# Patient Record
Sex: MALE | Race: WHITE | NOT HISPANIC OR LATINO | Employment: OTHER | URBAN - METROPOLITAN AREA
[De-identification: names, ages, dates, MRNs, and addresses within clinical notes are randomized per-mention and may not be internally consistent; named-entity substitution may affect disease eponyms.]

---

## 2019-03-27 ENCOUNTER — TELEPHONE (OUTPATIENT)
Dept: OTHER | Facility: CLINIC | Age: 49
End: 2019-03-27

## 2019-03-27 NOTE — TELEPHONE ENCOUNTER
3/27/2019    Call Regarding Onboarding: [employer] STRIVE PLAN    Attempt 1    Message left with patient     Comments: PT ON BOARDED      Outreach   MG

## 2019-10-15 ENCOUNTER — OFFICE VISIT (OUTPATIENT)
Dept: FAMILY MEDICINE | Facility: CLINIC | Age: 49
End: 2019-10-15
Payer: COMMERCIAL

## 2019-10-15 VITALS
HEART RATE: 95 BPM | TEMPERATURE: 98.7 F | OXYGEN SATURATION: 98 % | DIASTOLIC BLOOD PRESSURE: 83 MMHG | HEIGHT: 65 IN | BODY MASS INDEX: 30.72 KG/M2 | SYSTOLIC BLOOD PRESSURE: 134 MMHG | WEIGHT: 184.4 LBS

## 2019-10-15 DIAGNOSIS — I10 ESSENTIAL HYPERTENSION: ICD-10-CM

## 2019-10-15 DIAGNOSIS — G89.29 CHRONIC INTRACTABLE HEADACHE, UNSPECIFIED HEADACHE TYPE: Primary | ICD-10-CM

## 2019-10-15 DIAGNOSIS — G80.1 SPASTIC DIPLEGIC CEREBRAL PALSY (H): ICD-10-CM

## 2019-10-15 DIAGNOSIS — R51.9 CHRONIC INTRACTABLE HEADACHE, UNSPECIFIED HEADACHE TYPE: Primary | ICD-10-CM

## 2019-10-15 PROCEDURE — 99203 OFFICE O/P NEW LOW 30 MIN: CPT | Performed by: PREVENTIVE MEDICINE

## 2019-10-15 RX ORDER — METOPROLOL SUCCINATE 25 MG/1
25 TABLET, EXTENDED RELEASE ORAL DAILY
Refills: 2 | COMMUNITY
Start: 2019-10-08 | End: 2020-10-30

## 2019-10-15 RX ORDER — AMLODIPINE BESYLATE 5 MG/1
5 TABLET ORAL DAILY
Refills: 2 | COMMUNITY
Start: 2019-04-18 | End: 2020-10-30

## 2019-10-15 RX ORDER — PSEUDOEPHED/ACETAMINOPH/DIPHEN 30MG-500MG
TABLET ORAL
Refills: 10 | COMMUNITY
Start: 2019-03-06 | End: 2020-10-30

## 2019-10-15 RX ORDER — BACLOFEN 10 MG/1
10 TABLET ORAL 2 TIMES DAILY
Refills: 5 | COMMUNITY
Start: 2019-06-23 | End: 2020-10-30

## 2019-10-15 RX ORDER — METOPROLOL SUCCINATE 50 MG/1
TABLET, EXTENDED RELEASE ORAL
Refills: 0 | COMMUNITY
Start: 2019-04-30 | End: 2020-10-30

## 2019-10-15 RX ORDER — SULFAMETHOXAZOLE/TRIMETHOPRIM 800-160 MG
TABLET ORAL
Refills: 0 | COMMUNITY
Start: 2019-04-25 | End: 2019-10-15

## 2019-10-15 RX ORDER — GLYCOPYRROLATE 1 MG/1
1 TABLET ORAL 3 TIMES DAILY
Refills: 0 | COMMUNITY
Start: 2019-02-28 | End: 2022-01-27

## 2019-10-15 RX ORDER — CLINDAMYCIN HCL 300 MG
CAPSULE ORAL
Refills: 0 | COMMUNITY
Start: 2019-09-09 | End: 2019-10-15

## 2019-10-15 ASSESSMENT — MIFFLIN-ST. JEOR: SCORE: 1633.31

## 2019-10-15 ASSESSMENT — PAIN SCALES - GENERAL: PAINLEVEL: NO PAIN (0)

## 2019-10-15 NOTE — PROGRESS NOTES
Subjective     Dakota Wilkinson is a 49 year old male who presents to clinic today for the following health issues:    HPI   Referral to see a neurologist  History of chronic intractable headache, PCP is outside of [place], here for referral to Neurology, Primary at [facility]   Seen by PCP on 10/8/19  Headaches since age 21 years  No past Neurology   Almost every night  Excedrin for migraine every night  Lots of light sensitivity  Frontal  Piercing+  No emesis  No other medication besides Excedrin  Last primary is out of network hence now being seen at [place]         Headaches      Duration: since age 21 years     Description  Location: bilateral in the frontal area   Character: piercing pain   Frequency:  daily  Duration:  Few hours     Intensity:  moderate    Accompanying signs and symptoms:    Precipitating or Alleviating factors:  Nausea/vomiting: no  Dizziness: no  Weakness or numbness: no  Visual changes: none  Fever: no   Sinus or URI symptoms no     History  Head trauma: no  Family history of migraines: no   Previous tests for headaches: no   Neurologist evaluations: no   Able to do daily activities when headache present: YES  Wake with headaches: no  Daily pain medication use: YES- Excedrin   Any changes in: no changes     Precipitating or Alleviating factors (light/sound/sleep/caffeine): Light sensitivity     Therapies tried and outcome: Excedrin    Outcome - usually effective  Frequent/daily pain medication use: YES      Unable to access Care Everywhere due to IT issues at this time     Hypertension Follow-up      Do you check your blood pressure regularly outside of the clinic? No     Are you following a low salt diet? Yes    Are your blood pressures ever more than 140 on the top number (systolic) OR more   than 90 on the bottom number (diastolic), for example 140/90? No    Patient Active Problem List   Diagnosis     Chronic intractable headache, unspecified headache type     Spastic diplegic  "cerebral palsy (H)     Essential hypertension     History reviewed. No pertinent surgical history.    Social History     Tobacco Use     Smoking status: Never Smoker     Smokeless tobacco: Never Used   Substance Use Topics     Alcohol use: Yes     Comment: socially     Family History   Problem Relation Age of Onset     Diabetes Maternal Grandmother      Cerebrovascular Disease Maternal Grandmother      Dementia Maternal Grandmother      Cancer Maternal Grandfather          Current Outpatient Medications   Medication Sig Dispense Refill     ACETAMINOPHEN EXTRA STRENGTH 500 MG tablet TK 2 T TID PRN  10     amLODIPine (NORVASC) 5 MG tablet   2     baclofen (LIORESAL) 10 MG tablet TK SS TO ONE T  PO BID PRF SPASM/PAIN GEF LIORESAL. NEEDS APPOINTMENT FOR  FURTHER REFILLS  5     glycopyrrolate (ROBINUL) 1 MG tablet TK 1 T PO TID  0     metoprolol succinate ER (TOPROL-XL) 25 MG 24 hr tablet   2     metoprolol succinate ER (TOPROL-XL) 50 MG 24 hr tablet TK 1 T PO D  0     Allergies   Allergen Reactions     Amoxicillin      Codeine      Penicillins      Percocet [Oxycodone-Acetaminophen]      BP Readings from Last 3 Encounters:   10/15/19 134/83    Wt Readings from Last 3 Encounters:   10/15/19 83.6 kg (184 lb 6.4 oz)           Reviewed and updated as needed this visit by Provider  Tobacco  Allergies  Meds  Problems  Med Hx  Surg Hx  Fam Hx         Review of Systems   ROS COMP: Constitutional, HEENT, cardiovascular, pulmonary, gi and gu systems are negative, except as otherwise noted.      Objective    /83 (BP Location: Left arm, Patient Position: Chair, Cuff Size: Adult Large)   Pulse 95   Temp 98.7  F (37.1  C) (Oral)   Ht 1.651 m (5' 5\")   Wt 83.6 kg (184 lb 6.4 oz)   SpO2 98%   BMI 30.69 kg/m    Body mass index is 30.69 kg/m .  Physical Exam   GENERAL APPEARANCE: healthy, alert and no distress, ambulates with a cane   EYES: Eyes grossly normal to inspection and conjunctivae and sclerae normal  RESP: " "lungs clear to auscultation - no rales, rhonchi or wheezes  CV: regular rates and rhythm, normal S1 S2, no S3 or S4 and no murmur, click or rub  ABDOMEN: soft, non-tender and no rebound or guarding   MS: extremities normal- no gross deformities noted and peripheral pulses normal  SKIN: no suspicious lesions or rashes  PSYCH: mentation appears normal      Diagnostic Test Results:  Labs reviewed in Epic  No results found for this or any previous visit (from the past 24 hour(s)).        Assessment & Plan     Dakota was seen today for referral.    Diagnoses and all orders for this visit:    Chronic intractable headache, unspecified headache type  -     NEUROLOGY ADULT REFERRAL  -patient requested a referral  -Advised against daily use of Excedrin to reduce risk of rebound headaches     Spastic diplegic cerebral palsy (H)  -stable    Essential hypertension  -at goal on current medication per PCP        BMI:   Estimated body mass index is 30.69 kg/m  as calculated from the following:    Height as of this encounter: 1.651 m (5' 5\").    Weight as of this encounter: 83.6 kg (184 lb 6.4 oz).       Return in about 6 months (around 4/15/2020) for Routine Visit.    Sunita Dumont MD MPH    [facility]        "

## 2019-10-15 NOTE — PATIENT INSTRUCTIONS
At [facility], we strive to deliver an exceptional experience to you, every time we see you.  If you receive a survey in the mail, please send us back your thoughts. We really do value your feedback.    Based on your medical history, these are the current health maintenance/preventive care services that you are due for (some may have been done at this visit.)  Health Maintenance Due   Topic Date Due     PREVENTIVE CARE VISIT  1970     HIV SCREENING  06/13/1986     DTAP/TDAP/TD IMMUNIZATION (1 - Tdap) 06/13/1996     LIPID  06/13/2006     PHQ-2  01/01/2019         Suggested websites for health information:  Www.[facility].org : Up to date and easily searchable information on multiple topics.  Www.medlineplus.gov : medication info, interactive tutorials, watch real surgeries online  Www.familydoctor.org : good info from the Academy of Family Physicians  Www.cdc.gov : public health info, travel advisories, epidemics (H1N1)  Www.aap.org : children's health info, normal development, vaccinations  Www.health.[facility].[place].us : [place] dept of health, public health issues in [place], N1N1    Your care team:                            Family Medicine Internal Medicine   MD Tanner Santana MD Shantel Branch-Fleming, MD Katya Georgiev PA-C Nam Ho, MD Pediatrics   JOSE A Arthur, MD Megha Snider CNP, MD Deborah Mielke, MD Kim Thein, APRN [facility]      Clinic hours: Monday - Thursday 7 am-7 pm; Fridays 7 am-5 pm.   Urgent care: Monday - Friday 11 am-9 pm; Saturday and Sunday 9 am-5 pm.  Pharmacy : Monday -Thursday 8 am-8 pm; Friday 8 am-6 pm; Saturday and Sunday 9 am-5 pm.     Clinic: [phone]   Pharmacy: [phone]

## 2019-10-29 ENCOUNTER — HEALTH MAINTENANCE LETTER (OUTPATIENT)
Age: 49
End: 2019-10-29

## 2019-11-12 ENCOUNTER — PRE VISIT (OUTPATIENT)
Dept: NEUROLOGY | Facility: CLINIC | Age: 49
End: 2019-11-12

## 2019-11-12 NOTE — TELEPHONE ENCOUNTER
November 12, 2019              Stacy Young CMA     Chief Complaint   Patient presents with     Previsit     Complete       Pre-Visit Documentation: Dakota Wilkinson is a 49 year old male  Patient stated he has never seen a neurologist before and has not had any imaging done of his head in 20+ years.    Current Outpatient Medications   Medication Sig Dispense Refill     ACETAMINOPHEN EXTRA STRENGTH 500 MG tablet TK 2 T TID PRN  10     amLODIPine (NORVASC) 5 MG tablet   2     baclofen (LIORESAL) 10 MG tablet TK SS TO ONE T  PO BID PRF SPASM/PAIN GEF LIORESAL. NEEDS APPOINTMENT FOR  FURTHER REFILLS  5     glycopyrrolate (ROBINUL) 1 MG tablet TK 1 T PO TID  0     metoprolol succinate ER (TOPROL-XL) 25 MG 24 hr tablet   2     metoprolol succinate ER (TOPROL-XL) 50 MG 24 hr tablet TK 1 T PO D  0       ASSESSMENT / PLAN:  No diagnosis found.

## 2019-11-13 ENCOUNTER — OFFICE VISIT (OUTPATIENT)
Dept: NEUROLOGY | Facility: CLINIC | Age: 49
End: 2019-11-13
Attending: PREVENTIVE MEDICINE
Payer: COMMERCIAL

## 2019-11-13 VITALS
WEIGHT: 180 LBS | HEIGHT: 65 IN | BODY MASS INDEX: 29.99 KG/M2 | DIASTOLIC BLOOD PRESSURE: 89 MMHG | HEART RATE: 69 BPM | SYSTOLIC BLOOD PRESSURE: 132 MMHG | OXYGEN SATURATION: 97 %

## 2019-11-13 DIAGNOSIS — G43.019 INTRACTABLE MIGRAINE WITHOUT AURA AND WITHOUT STATUS MIGRAINOSUS: Primary | ICD-10-CM

## 2019-11-13 DIAGNOSIS — G47.00 INSOMNIA, UNSPECIFIED TYPE: ICD-10-CM

## 2019-11-13 PROCEDURE — 99204 OFFICE O/P NEW MOD 45 MIN: CPT | Performed by: PSYCHIATRY & NEUROLOGY

## 2019-11-13 RX ORDER — GABAPENTIN 300 MG/1
300 CAPSULE ORAL SEE ADMIN INSTRUCTIONS
Qty: 90 CAPSULE | Refills: 3 | Status: SHIPPED | OUTPATIENT
Start: 2019-11-13 | End: 2020-10-30

## 2019-11-13 ASSESSMENT — MIFFLIN-ST. JEOR: SCORE: 1613.35

## 2019-11-13 NOTE — PROGRESS NOTES
Visit Date:   11/13/2019      This is a patient of Dr. Sunita Dumont.      INTERVAL HISTORY:  This patient is a 49-year-old ambidextrous man seen for evaluation of light sensitivity and headache.  He reports he has had the problem for 20 years.  He has migraine-type headaches.  The headaches are mainly in his forehead and in his face.  They are especially noted in the bitemporal region.  He is extremely light sensitive.  Incandescent lights are bad. LED lights are okay.  Sunlight is okay.  The headaches are worse at night when there is artificial light in the house.  He has LED lights in his bedroom, but the rest of the house has incandescent lights or other old kind of lights.  Fluorescent lights also bother him.  He will have the extreme light sensitivity, then he will start getting the headache, then he will get nausea and photophobia.  He will also get neck pain.  He will have to go into a dark, quiet room for about 30 minutes.  He sometimes will wear sunglasses and that will help.  He lives with his mother and stepfather.  He cannot sleep with the headache.  His sleep as a rule is poor.  He only gets up to 3 hours at night.  He may take a nap during the day.  He never feels rested and that has been the case for 20 years.  He does take Excedrin 2 tablets at night and that will help after 15 minutes.  He has never been on gabapentin or nortriptyline.  He gets an occasional sparkling in his vision, but otherwise, no visual symptoms.  He has no problem with hearing, speech or swallowing.  He does not have focal symptoms in his arms, such as numbness or tingling.  He does have spastic diplegia since birth.  He has no problem with bowel or bladder control.  His diet is good.  He does exercise at the gym.  He focuses on upper body.  He does go on the treadmill, but has to go at a slow pace.      PAST MEDICAL HISTORY:  Significant for cerebral palsy with spastic diplegia.  He also has high blood pressure.  He does not  have diabetes, thyroid or asthma.  He did fill out a medical history form, including a 14-point review of systems.  There is nothing to add.  He takes baclofen for the spasticity.  He has not had pertinent surgery or trauma to the head or neck.        HABITS:  He rarely drinks alcohol.  He does not smoke.      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      FAMILY HISTORY:  Noncontributory.  His mother does not have headaches, and he has no siblings.      PHYSICAL EXAMINATION:   GENERAL:  The patient is cooperative and in no distress.   VITAL SIGNS:  His blood pressure is 132/89.   NECK:  There are no carotid bruits.   HEART:  Auscultation of the heart shows S1 and S2.   NEUROLOGIC:  The patient is alert, oriented and lucid.  Cranial nerve testing shows full visual fields to confrontation.  Funduscopic exam shows sharp discs bilaterally.  Visual acuity 20/20 bilaterally.  Eye movements are complete and conjugate without nystagmus.  Pupils react to light.  Facial sensation is normal.  Face moves symmetrically.  Palate elevates in the midline.  Tongue protrudes in the midline.  Motor evaluation shows no pronator drift, normal finger-tapping and finger-nose-finger.  Heel-knee-shin is quite difficult because of his spastic diplegia.  His ankles are inverted bilaterally.  There is marked limitation of movement in the legs.  He has good strength and dexterity in the arms and hands.  Muscle stretch reflexes are trace and symmetric in the arms and normal at the knees and reduced at the ankles.  Toes are mute.  Sensory exam shows some reduction in vibration at the left great toe, but preserved vibration and temperature in the right foot.  Modality is normal in the hands.  He walks with an obvious spastic diplegia.      IMAGING:  He does report that he had a CAT scan of the head done years ago for headache and it was unremarkable.      ASSESSMENT:   1.  Intractable migraine  with intense photophobia.   2.  Spastic diplegia.      DISCUSSION:  This patient is seen for evaluation of headache and intense photophobia.  His exam on this point is unremarkable.  He has had this problem for 20 years.  He also has issues with insomnia.  At this point, I am going to try him on a preventive medication, gabapentin, building up to 300 mg 3 times a day to see if that can keep the headaches from escalating.  If that is ineffective, a trial of nortriptyline could be undertaken.      I am going to refer him for Sleep consultation because of his chronic insomnia.  That could be a contributing factor.      Hopefully, the gabapentin will allow him to use less of the Excedrin Migraine and reduce the risk of medication-overuse headache.  I will see him in followup in a month for reexamination.         OG MORALES MD             D: 2019   T: 2019   MT: MIHAELA      Name:     DENEEN FLORES   MRN:      [id number]        Account:      [id number]   :      1970           Visit Date:   2019      Document: [id number]       cc: Sunita Dumont MD

## 2019-11-13 NOTE — LETTER
11/13/2019         RE: Dakota Wilkinson  [address] [place] [address]        Dear Colleague,    Thank you for referring your patient, Dakota Wilkinson, to the [facility]. Please see a copy of my visit note below.    Visit Date:   11/13/2019      This is a patient of Dr. Sunita Dumont.      INTERVAL HISTORY:  This patient is a 49-year-old ambidextrous man seen for evaluation of light sensitivity and headache.  He reports he has had the problem for 20 years.  He has migraine-type headaches.  The headaches are mainly in his forehead and in his face.  They are especially noted in the bitemporal region.  He is extremely light sensitive.  Incandescent lights are bad. LED lights are okay.  Sunlight is okay.  The headaches are worse at night when there is artificial light in the house.  He has LED lights in his bedroom, but the rest of the house has incandescent lights or other old kind of lights.  Fluorescent lights also bother him.  He will have the extreme light sensitivity, then he will start getting the headache, then he will get nausea and photophobia.  He will also get neck pain.  He will have to go into a dark, quiet room for about 30 minutes.  He sometimes will wear sunglasses and that will help.  He lives with his mother and stepfather.  He cannot sleep with the headache.  His sleep as a rule is poor.  He only gets up to 3 hours at night.  He may take a nap during the day.  He never feels rested and that has been the case for 20 years.  He does take Excedrin 2 tablets at night and that will help after 15 minutes.  He has never been on gabapentin or nortriptyline.  He gets an occasional sparkling in his vision, but otherwise, no visual symptoms.  He has no problem with hearing, speech or swallowing.  He does not have focal symptoms in his arms, such as numbness or tingling.  He does have spastic diplegia since birth.  He has no problem with bowel or bladder control.  His diet  is good.  He does exercise at the gym.  He focuses on upper body.  He does go on the treadmill, but has to go at a slow pace.      PAST MEDICAL HISTORY:  Significant for cerebral palsy with spastic diplegia.  He also has high blood pressure.  He does not have diabetes, thyroid or asthma.  He did fill out a medical history form, including a 14-point review of systems.  There is nothing to add.  He takes baclofen for the spasticity.  He has not had pertinent surgery or trauma to the head or neck.        HABITS:  He rarely drinks alcohol.  He does not smoke.      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      FAMILY HISTORY:  Noncontributory.  His mother does not have headaches, and he has no siblings.      PHYSICAL EXAMINATION:   GENERAL:  The patient is cooperative and in no distress.   VITAL SIGNS:  His blood pressure is 132/89.   NECK:  There are no carotid bruits.   HEART:  Auscultation of the heart shows S1 and S2.   NEUROLOGIC:  The patient is alert, oriented and lucid.  Cranial nerve testing shows full visual fields to confrontation.  Funduscopic exam shows sharp discs bilaterally.  Visual acuity 20/20 bilaterally.  Eye movements are complete and conjugate without nystagmus.  Pupils react to light.  Facial sensation is normal.  Face moves symmetrically.  Palate elevates in the midline.  Tongue protrudes in the midline.  Motor evaluation shows no pronator drift, normal finger-tapping and finger-nose-finger.  Heel-knee-shin is quite difficult because of his spastic diplegia.  His ankles are inverted bilaterally.  There is marked limitation of movement in the legs.  He has good strength and dexterity in the arms and hands.  Muscle stretch reflexes are trace and symmetric in the arms and normal at the knees and reduced at the ankles.  Toes are mute.  Sensory exam shows some reduction in vibration at the left great toe, but preserved vibration and temperature in  the right foot.  Modality is normal in the hands.  He walks with an obvious spastic diplegia.      IMAGING:  He does report that he had a CAT scan of the head done years ago for headache and it was unremarkable.      ASSESSMENT:   1.  Intractable migraine with intense photophobia.   2.  Spastic diplegia.      DISCUSSION:  This patient is seen for evaluation of headache and intense photophobia.  His exam on this point is unremarkable.  He has had this problem for 20 years.  He also has issues with insomnia.  At this point, I am going to try him on a preventive medication, gabapentin, building up to 300 mg 3 times a day to see if that can keep the headaches from escalating.  If that is ineffective, a trial of nortriptyline could be undertaken.      I am going to refer him for Sleep consultation because of his chronic insomnia.  That could be a contributing factor.      Hopefully, the gabapentin will allow him to use less of the Excedrin Migraine and reduce the risk of medication-overuse headache.  I will see him in followup in a month for reexamination.         OG GALLEGO MD             D: 2019   T: 2019   MT: MIHAELA      Name:     DENEEN FLORES   MRN:      [id number]        Account:      [id number]   :      1970           Visit Date:   2019      Document: [id number]       cc: Sunita Dumont MD      Again, thank you for allowing me to participate in the care of your patient.        Sincerely,        Og Gallego MD

## 2019-11-13 NOTE — NURSING NOTE
"Dakota Wilkinson's goals for this visit include: consult  He requests these members of his care team be copied on today's visit information:     PCP: Sunita Dumont    Referring Provider:  Sunita Dumont MD  [address] [place] [address]    /89   Pulse 69   Ht 1.651 m (5' 5\")   Wt 81.6 kg (180 lb)   SpO2 97%   BMI 29.95 kg/m      Do you need any medication refills at today's visit? n  "

## 2020-01-22 PROBLEM — G80.1 SPASTIC DIPLEGIC CEREBRAL PALSY (H): Chronic | Status: ACTIVE | Noted: 2019-10-15

## 2020-01-22 PROBLEM — S82.252A CLOSED DISPLACED COMMINUTED FRACTURE OF SHAFT OF LEFT TIBIA: Status: ACTIVE | Noted: 2017-07-14

## 2020-01-22 PROBLEM — S82.252A CLOSED DISPLACED COMMINUTED FRACTURE OF SHAFT OF LEFT TIBIA: Status: RESOLVED | Noted: 2017-07-14 | Resolved: 2020-01-22

## 2020-01-22 PROBLEM — G80.1 SPASTIC DIPLEGIC CEREBRAL PALSY (H): Status: ACTIVE | Noted: 2019-10-15

## 2020-01-22 PROBLEM — I10 ESSENTIAL HYPERTENSION: Chronic | Status: ACTIVE | Noted: 2019-10-15

## 2020-01-22 PROBLEM — R51.9 CHRONIC INTRACTABLE HEADACHE, UNSPECIFIED HEADACHE TYPE: Chronic | Status: ACTIVE | Noted: 2019-10-15

## 2020-01-22 PROBLEM — R61 EXCESSIVE SWEATING: Status: ACTIVE | Noted: 2020-01-22

## 2020-01-22 PROBLEM — G89.29 CHRONIC INTRACTABLE HEADACHE, UNSPECIFIED HEADACHE TYPE: Chronic | Status: ACTIVE | Noted: 2019-10-15

## 2020-01-23 ENCOUNTER — OFFICE VISIT (OUTPATIENT)
Dept: SLEEP MEDICINE | Facility: CLINIC | Age: 50
End: 2020-01-23
Attending: PSYCHIATRY & NEUROLOGY
Payer: COMMERCIAL

## 2020-01-23 VITALS
BODY MASS INDEX: 23.32 KG/M2 | SYSTOLIC BLOOD PRESSURE: 128 MMHG | WEIGHT: 140 LBS | HEIGHT: 65 IN | DIASTOLIC BLOOD PRESSURE: 86 MMHG | OXYGEN SATURATION: 93 % | HEART RATE: 92 BPM

## 2020-01-23 DIAGNOSIS — G47.00 INSOMNIA, UNSPECIFIED TYPE: ICD-10-CM

## 2020-01-23 DIAGNOSIS — R53.83 MALAISE AND FATIGUE: ICD-10-CM

## 2020-01-23 DIAGNOSIS — R53.81 MALAISE AND FATIGUE: ICD-10-CM

## 2020-01-23 DIAGNOSIS — I10 ESSENTIAL HYPERTENSION: Primary | Chronic | ICD-10-CM

## 2020-01-23 DIAGNOSIS — F51.04 CHRONIC INSOMNIA: ICD-10-CM

## 2020-01-23 DIAGNOSIS — R06.89 DYSPNEA AND RESPIRATORY ABNORMALITY: ICD-10-CM

## 2020-01-23 DIAGNOSIS — G47.9 DISTURBANCE IN SLEEP BEHAVIOR: ICD-10-CM

## 2020-01-23 DIAGNOSIS — R06.00 DYSPNEA AND RESPIRATORY ABNORMALITY: ICD-10-CM

## 2020-01-23 PROCEDURE — 99245 OFF/OP CONSLTJ NEW/EST HI 55: CPT | Performed by: INTERNAL MEDICINE

## 2020-01-23 RX ORDER — ZOLPIDEM TARTRATE 5 MG/1
TABLET ORAL
Qty: 1 TABLET | Refills: 0 | Status: SHIPPED | OUTPATIENT
Start: 2020-01-23 | End: 2020-10-30

## 2020-01-23 SDOH — HEALTH STABILITY: MENTAL HEALTH: HOW OFTEN DO YOU HAVE A DRINK CONTAINING ALCOHOL?: 2-4 TIMES A MONTH

## 2020-01-23 ASSESSMENT — MIFFLIN-ST. JEOR: SCORE: 1431.92

## 2020-01-23 NOTE — PROGRESS NOTES
Sleep Consultation:    Date on this visit: 1/23/2020    Dakota Wilkinson is sent by Wilmar Gallego for a sleep consultation regarding insomnia.    Primary Physician: Sunita Dumont     Chief Complaint   Patient presents with     Sleep Problem     consult- to get help with insomnia      He has sleep onset > maintenance difficulties. This has been a problem for '27  Years'. No trigger recalled.   He has 'stress'. He has trouble turning his brain off.     Dakota goes to bed at 11:00 PM during the week. He gets up at 4-5 AM with an alarm out of habit. He falls asleep in 120 minutes. He wakes up 3 times a night and has trouble falling back to sleep because of an overactive brain. He wakwes up because of Pets.     On weekends, Dakota goes to bed at 12:00 AM.  He wakes up at 7-8 AM without an alarm. Patient gets an average of 3-4 hours of sleep per night.     Patient does use electronics in bed. He listens to music in bed.     Dakota does snore. Patient does not have a regular bed partner. There is not report of gasping, choking and snorting.  He does not have witnessed apneas. Patient sleeps on his side.     He has occasional morning headaches (1/week), denies no restless legs. He gets muscle spasms that interfere with sleep but not as much his overactive brain. They occur about 2 times/week and occur in middle of the night and wake him up. He kicks in his sleep.     Dakota denies any sleep walking, sleep talking, sleep paralysis, cataplexy and hypnogogic/hypnopompic hallucinations.  He has taken his clothes off in bed while sleeping.     Dakota denies difficulty breathing through his nose and reflux at night.      Patient describes themself as neither a morning or night person. Patient's Tamms Sleepiness score 0/24 inconsistent with excessive  daytime sleepiness.  Rescored 2. He has fatigue.     Dakota naps 1 times per week on Mondays for 30-60 minutes. He takes some inadvertant naps while 'resting'.  He does not drive.  He uses infrequent sodas    Allergies:    Allergies   Allergen Reactions     Amoxicillin      Codeine      Morphine Unknown     Penicillins      Percocet [Oxycodone-Acetaminophen]        Medications:    Current Outpatient Medications   Medication Sig Dispense Refill     ACETAMINOPHEN EXTRA STRENGTH 500 MG tablet TK 2 T TID PRN  10     amLODIPine (NORVASC) 5 MG tablet Take 5 mg by mouth daily   2     aspirin-acetaminophen-caffeine (EXCEDRIN MIGRAINE) 250-250-65 MG tablet Take 2 tablets by mouth 2 times daily       baclofen (LIORESAL) 10 MG tablet Take 10 mg by mouth 2 times daily   5     gabapentin (NEURONTIN) 300 MG capsule Take 1 capsule (300 mg) by mouth See Admin Instructions One po q day for 3 days, then one po bid for 3 days, then one po tid. 90 capsule 3     glycopyrrolate (ROBINUL) 1 MG tablet Take 1 mg by mouth 3 times daily   0     metoprolol succinate ER (TOPROL-XL) 25 MG 24 hr tablet Take 25 mg by mouth daily   2     metoprolol succinate ER (TOPROL-XL) 50 MG 24 hr tablet TK 1 T PO D  0       Problem List:  Patient Active Problem List    Diagnosis Date Noted     Spastic diplegic cerebral palsy (H) 10/15/2019     Priority: High     Migraines      Priority: Medium     Essential hypertension 10/15/2019     Priority: Medium     Excessive sweating 01/22/2020     Priority: Low        Past Medical/Surgical History:  Past Medical History:   Diagnosis Date     Closed displaced comminuted fracture of shaft of left tibia 7/14/2017     Past Surgical History:   Procedure Laterality Date     ORTHOPEDIC SURGERY  07/14/2017    IM ZARA TIBIA 7/14/2017 Leg Lower/LeftProcedure: IM ZARA LEFT TIBIA; Laterality: Left; Surgeon: Zaki Bonilla MD; Service: Orthopedics       ORTHOPEDIC SURGERY Bilateral 1993    multiple arthroscopies early 1990s     ORTHOPEDIC SURGERY Bilateral 1993    nerve 'tranposition'? 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[employer]   Social Needs     Financial resource strain: Not on file     Food insecurity:     Worry: Not on file     Inability: Not on file     Transportation needs:     Medical: Not on file     Non-medical: Not on file   Tobacco Use     Smoking status: Former Smoker     Packs/day: 0.00     Smokeless tobacco: Never Used   Substance and Sexual Activity     Alcohol use: Yes     Frequency: 2-4 times a month     Comment: socially     Drug use: Never     Sexual activity: Not Currently   Lifestyle     Physical activity:     Days per week: Not on file     Minutes per session: Not on file     Stress: Not on file   Relationships     Social connections:     Talks on phone: Not on file     Gets together: Not on file     Attends Sabianism service: Not on file     Active member of club or organization: Not on file     Attends meetings of clubs or organizations: Not on file     Relationship status: Not on file     Intimate partner violence:     Fear of current or ex partner: Not on file     Emotionally abused: Not on file     Physically abused: Not on file     Forced sexual activity: Not on file   Other Topics Concern     Not on file   Social History Narrative     Not on file       Family History:  Family History   Problem Relation Age of Onset     Diabetes Maternal Grandmother      Cerebrovascular Disease Maternal Grandmother      Dementia Maternal Grandmother      Cancer Maternal Grandfather        Review of Systems:  A complete review of systems reviewed by me is negative with the exeption of what has been mentioned in the history of present illness.  CONSTITUTIONAL: NEGATIVE for weight gain/loss, fever, chills, sweats or night sweats, drug allergies.  EYES: NEGATIVE for changes in vision, blind spots, double vision.  ENT:  "NEGATIVE for ear pain, sore throat, sinus pain, post-nasal drip, runny nose, bloody nose  CARDIAC: NEGATIVE for fast heartbeats or fluttering in chest, chest pain or pressure, breathlessness when lying flat, swollen legs or swollen feet.  NEUROLOGIC:  POSITIVE for  headaches  DERMATOLOGIC: NEGATIVE for rashes, new moles or change in mole(s)  PULMONARY: NEGATIVE SOB at rest, SOB with activity, dry cough, productive cough, coughing up blood, wheezing or whistling when breathing.    GASTROINTESTINAL: NEGATIVE for nausea or vomitting, loose or watery stools, fat or grease in stools, constipation, abdominal pain, bowel movements black in color or blood noted.  GENITOURINARY: NEGATIVE for pain during urination, blood in urine, urinating more frequently than usual, irregular menstrual periods.  MUSCULOSKELETAL: NEGATIVE for muscle pain, bone or joint pain, swollen joints.  ENDOCRINE: NEGATIVE for increased thirst or urination, diabetes.  LYMPHATIC: NEGATIVE for swollen lymph nodes, lumps or bumps in the breasts or nipple discharge.    Physical Examination:  Vitals: BP (!) 138/92   Pulse 92   Ht 1.651 m (5' 5\")   Wt 63.5 kg (140 lb)   SpO2 93%   BMI 23.30 kg/m    BMI= Body mass index is 23.3 kg/m .    Neck Cir (cm): 40 cm    [place] Total Score 1/23/2020   Total score - [place] 0       HENNA Total Score: 24 (01/23/20 0800)    GENERAL APPEARANCE: alert and no distress  EYES: Eyes grossly normal to inspection and conjunctivae and sclerae normal  HENT: nose and mouth without ulcers or lesions and oropharynx crowded  NECK: no adenopathy, no asymmetry, masses, or scars and thyroid normal to palpation  RESP: lungs clear to auscultation - no rales, rhonchi or wheezes  CV: regular rates and rhythm, normal S1 S2, no S3 or S4 and no murmur, click or rub  ABDOMEN: schaphold  MS: extremities normal- no gross deformities noted  NEURO: mentation intact, speech normal and cranial nerves 2-12 intact  PSYCH: mentation appears normal and " affect normal/bright  Mallampati Class: IV.  Tonsillar Stage: difficult exam due to body habitus and gag.    Impression/Plan:     Sleep onset, maintenance difficulties  Suspect primary problem is Psychophysiologic insomnia   We discussed stimulus control , sleep needs, sleep restriction  - Read the book Say Good Night To Insomnia   - Referral for cognitive behavioral training placed     Mild snoring, fatigue (ESS 2), sleep maintenance difficulties, no current bedpartner, crowded oropharynx. Comorbid hypertension. Polysomnogram (using 4% desaturation/Medicare/2012 AASM 1B scoring rules) for modest probability obstructive sleep apnea.  Ambien if needed. Patient is a poor candidate for Home Sleep Testing due to symptoms of QUINN but low pre-test probability of QUINN  (STOPBANG 3) and chronic severe insomnia (HENNA score 24).      Literature provided regarding sleep apnea and insomnia.      He will follow up with me in approximately two weeks after his sleep study has been competed to review the results and discuss plan of care.         Obstructive sleep apnea reviewed.    Butch Osman MD     CC: Wilmar Gallego

## 2020-01-23 NOTE — PATIENT INSTRUCTIONS
Your BMI is Body mass index is 23.3 kg/m .  Weight management is a personal decision.  If you are interested in exploring weight loss strategies, the following discussion covers the approaches that may be successful. Body mass index (BMI) is one way to tell whether you are at a healthy weight, overweight, or obese. It measures your weight in relation to your height.  A BMI of 18.5 to 24.9 is in the healthy range. A person with a BMI of 25 to 29.9 is considered overweight, and someone with a BMI of 30 or greater is considered obese. More than two-thirds of American adults are considered overweight or obese.  Being overweight or obese increases the risk for further weight gain. Excess weight may lead to heart disease and diabetes.  Creating and following plans for healthy eating and physical activity may help you improve your health.  Weight control is part of healthy lifestyle and includes exercise, emotional health, and healthy eating habits. Careful eating habits lifelong are the mainstay of weight control. Though there are significant health benefits from weight loss, long-term weight loss with diet alone may be very difficult to achieve- studies show long-term success with dietary management in less than 10% of people. Attaining a healthy weight may be especially difficult to achieve in those with severe obesity. In some cases, medications, devices and surgical management might be considered.  What can you do?  If you are overweight or obese and are interested in methods for weight loss, you should discuss this with your provider.     Consider reducing daily calorie intake by 500 calories.     Keep a food journal.     Avoiding skipping meals, consider cutting portions instead.    Diet combined with exercise helps maintain muscle while optimizing fat loss. Strength training is particularly important for building and maintaining muscle mass. Exercise helps reduce stress, increase energy, and improves fitness.  Increasing exercise without diet control, however, may not burn enough calories to loose weight.       Start walking three days a week 10-20 minutes at a time    Work towards walking thirty minutes five days a week     Eventually, increase the speed of your walking for 1-2 minutes at time    In addition, we recommend that you review healthy lifestyles and methods for weight loss available through the National Institutes of Health patient information sites:  http://win.niddk.nih.gov/publications/index.htm    And look into health and wellness programs that may be available through your health insurance provider, employer, local community center, or fatoumata club.    Weight management plan: Patient was referred to their PCP to discuss a diet and exercise plan.    Read the book Say Good Night To Insomnia        Cognitive Behavioral Therapy for Insomnia (CBT-I)    What is CBT-I?    Cognitive Behavioral Therapy for Insomnia, also known as CBT-I, is a highly effective non-drug treatment for insomnia. The American College of Physicians recommends CBT-I as the first treatment for chronic insomnia.  Research has shown CBT-I to be safer and more effective long term than sleeping pills.    What does CBT-I involve?     CBT-I targets behaviors that lead to chronic insomnia:    Habits that weaken the bed as a cue for sleep    Habits that weaken your body's sleep drive and sleep/wake clock     Unhelpful sleep thoughts that increase sleep-related worry and arousal.    The process works like a training program that provides you with the information and coaching needed to implement proven strategies to get a better night's sleep.    The [facility] offers several effective treatment options.  You can do CBT-I from the convenience of your own home through our Online CBT-I and Virtual CBT-I options. Our program also offers in-person CBTI-I at certain primary care clinics, specialty clinics, and   [facility].    How much effort will it take?    To get the full benefit from CBT-I, you will need to put into practice the strategies recommended as part of your personalized program.  You will keep a daily sleep diary throughout treatment to record your sleep patterns and progress.      How long will it take to work?    People often see improvement in their sleep within a few weeks. Research shows if you keep practicing the skills you learn your sleep is likely to continue to improve 6-12 months after treatment.    Are there side effects?    Unlike many prescribed sleeping pills, CBT-I is a safe treatment with few side effects.  During the first few weeks, you may experience an increase in daytime sleepiness.     What about sleep medication?    Some people choose to stop using sleep medication prior to beginning CBT-I.  Others gradually reduce or stop using medication during treatment with the guidance of their prescribing provider. Always talk with you prescribing provider before making any changes to your medication.    How do I get started?    [facility] CBT-I begins with a pre-clinical phone visit with a member of our Sleep Therapy Management team.  During the phone visit, your care coordinator will discuss your treatment options, answer any questions and get you set to start sleep training. You can schedule your insomnia preclinical phone visit by calling the [facility] at [place] ([phone]) or [place] ([phone]).

## 2020-01-23 NOTE — NURSING NOTE
"Chief Complaint   Patient presents with     Sleep Problem     consult- to get help with insomnia       Initial BP (!) 138/92   Pulse 92   Ht 1.651 m (5' 5\")   Wt 63.5 kg (140 lb)   SpO2 93%   BMI 23.30 kg/m   Estimated body mass index is 23.3 kg/m  as calculated from the following:    Height as of this encounter: 1.651 m (5' 5\").    Weight as of this encounter: 63.5 kg (140 lb).    Medication Reconciliation: complete    Neck circumference: 15.5 inches / 39.5 centimeters.      "

## 2020-01-28 ENCOUNTER — DOCUMENTATION ONLY (OUTPATIENT)
Dept: SLEEP MEDICINE | Facility: CLINIC | Age: 50
End: 2020-01-28
Payer: COMMERCIAL

## 2020-01-28 DIAGNOSIS — F51.04 CHRONIC INSOMNIA: ICD-10-CM

## 2020-01-28 NOTE — PROGRESS NOTES
Pt contacted for [facility] Insomnia preclinical visit. Pt states that he struggles with waking up during the night and is unable to go back to sleep.  Overview of CBT-I was given. Pt elected for in-person CBT-I. Sleep diaries and first module explained and emailed to pt.  Pt scheduled appointment for 2/6/20 with Dr. Heredia.

## 2020-02-06 ENCOUNTER — OFFICE VISIT (OUTPATIENT)
Dept: SLEEP MEDICINE | Facility: CLINIC | Age: 50
End: 2020-02-06
Payer: COMMERCIAL

## 2020-02-06 VITALS
HEART RATE: 91 BPM | HEIGHT: 65 IN | WEIGHT: 150 LBS | OXYGEN SATURATION: 94 % | DIASTOLIC BLOOD PRESSURE: 94 MMHG | SYSTOLIC BLOOD PRESSURE: 132 MMHG | BODY MASS INDEX: 24.99 KG/M2

## 2020-02-06 DIAGNOSIS — F51.03 PARADOXICAL INSOMNIA: ICD-10-CM

## 2020-02-06 DIAGNOSIS — F51.04 CHRONIC INSOMNIA: ICD-10-CM

## 2020-02-06 DIAGNOSIS — F51.04 CHRONIC INSOMNIA: Primary | ICD-10-CM

## 2020-02-06 PROCEDURE — 90791 PSYCH DIAGNOSTIC EVALUATION: CPT | Performed by: PSYCHOLOGIST

## 2020-02-06 ASSESSMENT — PATIENT HEALTH QUESTIONNAIRE - PHQ9
SUM OF ALL RESPONSES TO PHQ QUESTIONS 1-9: 6
5. POOR APPETITE OR OVEREATING: NOT AT ALL

## 2020-02-06 ASSESSMENT — ANXIETY QUESTIONNAIRES
1. FEELING NERVOUS, ANXIOUS, OR ON EDGE: NOT AT ALL
GAD7 TOTAL SCORE: 0
6. BECOMING EASILY ANNOYED OR IRRITABLE: NOT AT ALL
7. FEELING AFRAID AS IF SOMETHING AWFUL MIGHT HAPPEN: NOT AT ALL
2. NOT BEING ABLE TO STOP OR CONTROL WORRYING: NOT AT ALL
5. BEING SO RESTLESS THAT IT IS HARD TO SIT STILL: NOT AT ALL
3. WORRYING TOO MUCH ABOUT DIFFERENT THINGS: NOT AT ALL

## 2020-02-06 ASSESSMENT — MIFFLIN-ST. JEOR: SCORE: 1477.28

## 2020-02-06 NOTE — NURSING NOTE
"Chief Complaint   Patient presents with     Sleep Problem     consult-  insomnia referred by Jacqui       Initial BP (!) 132/94   Pulse 91   Ht 1.651 m (5' 5\")   Wt 68 kg (150 lb)   SpO2 94%   BMI 24.96 kg/m   Estimated body mass index is 24.96 kg/m  as calculated from the following:    Height as of this encounter: 1.651 m (5' 5\").    Weight as of this encounter: 68 kg (150 lb).    Medication Reconciliation: complete    Neck circumference: 16 inches / 40 centimeters.      "

## 2020-02-06 NOTE — PROGRESS NOTES
[facility]  ) Charges says the meds have been reviewed is a just the 1 June a month just 1:00 1 was just as recommended med review is okay SLEEP MEDICINE CONSULTATION  Sleep Psychology    Name: Dakota Wilkinson MRN# [id number]   Age: 49 year old YOB: 1970     Date of Consultation: Feb 6, 2020  Consultation is requested by: No referring provider defined for this encounter.  Primary care provider: Sunita Dumont    Reason for Sleep Consultation     Dakota Wilkinson is a 49 year old male seen today for a behavioral sleep medicine consultation because of insomnia associated with suspected sleep apnea.      Assessment and Plan     Sleep Diagnoses/Recommendations:    1. Chronic insomnia    2. Paradoxical insomnia  Insomnia peers highly consistent with chronic psychophysiologic insomnia.  However there is a fairly marked sleep state misperception with patient insisting that he is getting only 2-3 hours of sleep at night.  Despite this he is not reporting excessive daytime sleepiness and otherwise is functioning fairly well overall.  He is scheduled for an in lab sleep study to evaluate for suspected sleep disordered breathing.  We will add 2 weeks of actigraphy to get a better sense of actual night to night sleep.  We will also use this data to apply to behavioral plan focusing on stimulus control, appropriate limitation of time in bed and addressing maladaptive beliefs and sleep state.   misperception.  Lastly, patient does have maladaptive worry and focus on sleep that likely increase psychophysiologic arousal particularly at bed space leading to some conditioned arousal.    - Comprehensive Sleep Study; Future      See patient instructions for initial treatment recommendations and behavioral sleep plan.    Summary Counseling:      Dakota was provided information about the pathophysiology of insomnia and psychophysiological factors contributing to the onset and maintenance of insomnia .  Treatment option  "were discussed including component of cognitive-behavioral therapy for insomnia. The benefits and potential early side effects of treatment including increased daytime sleepiness were discussed. She was advised to seek medical advise and consultation around use of or changes to prescription sleep medication, Patient was counseled on the importance of avoiding driving if drowsy.        Follow-up: 4 week     History of Present Sleep Complaint     Dakota Wilkinson is a 49 year old year old male who reports difficulty sleeping for 27 years.  He recalls no specific trigger.  He states that he has moderate or greater stress and has trouble \"turning my brain off\".  He usually goes to bed around 11 PM but indicates that he does not fall asleep until 2-3 AM in the morning.  He usually gets out of bed for the day by 7-8 AM.  He is average total time in bed per his current sleep diaries is about 3-4 hours.  He estimates that his total sleep time is about 3 hours.  Despite this he does not report daytime sleepiness.  He does report degree of tiredness but otherwise he can function at work.    He does not report any drowsy driving or sleep-related accidents.    Patient drinks 0-1 caffeinated beverages per day.  He does not use alcohol in the evenings.  He does not smoke or use recreational drugs.    His bed space is quite uncomfortable.  He denies any sleepwalking, s sleep talking.:    Patient does report sleep specific concern.  He reports he is not getting enough sleep, that sleep will cause unmanageable daytime effects.    Previous Sleep Studies:    In lab sleep study scheduled for February 27, actigraphy order sleep psychology      Screening          [place] Sleepiness Scale  Total score - [place]: 3 .    HENNA Total Score: 27       PHQ-9 SCORE 2/6/2020   PHQ-9 Total Score 6       VICKY-7 SCORE 2/6/2020   Total Score 0       Patient Activation Score   No flowsheet data found.      Vitals     BP (!) 132/94   Pulse 91   Ht 1.651 m " "(5' 5\")   Wt 68 kg (150 lb)   SpO2 94%   BMI 24.96 kg/m       Medical History     Patient Active Problem List   Diagnosis     Spastic diplegic cerebral palsy (H)     Essential hypertension     Migraines     Excessive sweating     Chronic insomnia        Current Outpatient Medications   Medication Sig Dispense Refill     ACETAMINOPHEN EXTRA STRENGTH 500 MG tablet TK 2 T TID PRN  10     amLODIPine (NORVASC) 5 MG tablet Take 5 mg by mouth daily   2     aspirin-acetaminophen-caffeine (EXCEDRIN MIGRAINE) 250-250-65 MG tablet Take 2 tablets by mouth 2 times daily       baclofen (LIORESAL) 10 MG tablet Take 10 mg by mouth 2 times daily   5     gabapentin (NEURONTIN) 300 MG capsule Take 1 capsule (300 mg) by mouth See Admin Instructions One po q day for 3 days, then one po bid for 3 days, then one po tid. 90 capsule 3     glycopyrrolate (ROBINUL) 1 MG tablet Take 1 mg by mouth 3 times daily   0     metoprolol succinate ER (TOPROL-XL) 25 MG 24 hr tablet Take 25 mg by mouth daily   2     metoprolol succinate ER (TOPROL-XL) 50 MG 24 hr tablet TK 1 T PO D  0     zolpidem (AMBIEN) 5 MG tablet Take tablet by mouth 15 minutes prior to sleep, for Sleep Study 1 tablet 0       Past Surgical History:   Procedure Laterality Date     ORTHOPEDIC SURGERY  07/14/2017    IM ZARA TIBIA 7/14/2017 Leg Lower/LeftProcedure: IM ZARA LEFT TIBIA; Laterality: Left; Surgeon: Zaki Bonilla MD; Service: Orthopedics       ORTHOPEDIC SURGERY Bilateral 1993    multiple arthroscopies early 1990s     ORTHOPEDIC SURGERY Bilateral 1993    nerve 'tranposition'? both legs          Allergies   Allergen Reactions     Amoxicillin      Codeine      Morphine Unknown     Penicillins      Percocet [Oxycodone-Acetaminophen]          Psychiatric History     Prior Psychiatric Diagnoses:  None reported   Psychiatric Hospitalizations: none   Use of Psychotropics none      Chemical Use     Prior Chemical Dependency Treatment: none         Family History     Family " History   Problem Relation Age of Onset     Diabetes Maternal Grandmother      Cerebrovascular Disease Maternal Grandmother      Dementia Maternal Grandmother      Cancer Maternal Grandfather        Sleep Disorders: None reported    Social History     Social History     Socioeconomic History     Marital status:      Spouse name: None     Number of children: None     Years of education: None     Highest education level: None   Occupational History     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[employer]   Social Needs     Financial resource strain: None     Food insecurity:     Worry: None     Inability: None     Transportation needs:     Medical: None     Non-medical: None   Tobacco Use     Smoking status: Former Smoker     Packs/day: 0.00     Smokeless tobacco: Never Used   Substance and Sexual Activity     Alcohol use: Yes     Frequency: 2-4 times a month     Comment: socially     Drug use: Never     Sexual activity: Not Currently   Lifestyle     Physical activity:     Days per week: None     Minutes per session: None     Stress: None   Relationships     Social connections:     Talks on phone: None     Gets together: None     Attends [employer] service: None     Active member of club or organization: None     Attends meetings of clubs or organizations: None     Relationship status: None     Intimate partner violence:     Fear of current or ex partner: None     Emotionally abused: None     Physically abused: None     Forced sexual activity: None   Other Topics Concern     None   Social History Narrative     None          Mental Status Examination     Dakota presented as appropriately dressed and groomed and was oriented X3 with speech language intact.  The patient was cooperative throughout the evaluation with no signs of hallucinations, delusions, loosening of associations or other thought disturbance.  Mood was normal.  Affect was congruent with mood. Insight and judgement we intact.  Memory  was intact for recent and remote elements.  There was no report of suicidal ideation, intention or plan. Attention and concentration were within normal.      Time Spent: 45 minutes    Copy:   Sunita Dumont               No referring provider defined for this encounter.    Anatoliy Heredia PsyD, CARINE, [facility]  Diplomate, Behavioral Sleep Medicine  [facility] -  [place] [facility] [place]

## 2020-02-06 NOTE — PATIENT INSTRUCTIONS
Consider a separate sleep bed for your dog in your bed.  Use an alarm and keep alarm away from bed  Set a sleep window between 11 PM and 6 AM  Use the bed only for sleep.  Don't to to bed until ready for sleep  Get out of bed if you can't sleep or are awake for about 30 minute  We will be doing actigraphy for two weeks to monitor your sleep.  Keep a sleep diary morning.  No napping          Cognitive Behavioral Therapy for Insomnia (CBT-I)    Developing Healthy Sleep Thoughts    Insomnia is often is triggered by stressful events such as a change in employment, a separation, medical illness, or loss.  How you handle your sleep problem, mainly your thoughts and behaviors, determines in large part whether your sleeplessness is short -term or develops into chronic insomnia well after the stressful event is over.     This step of your program involves learning a set of skills to change negative and worrisome thoughts about sleep.   Insomnia is more likely to persist if you interpret the onset as a threat or loss of control.  Worry, fears and untrue beliefs about insomnia can become a vicious cycle.  Negative sleep thoughts activate the physical and emotional systems of your body.  This strengthens wakefulness and weakens your sleep system.  This in turn produces increased stress and pressure to sleep.  We call this unhelpful sleep effort.     Changing How You Think About Insomnia    We now know that our thoughts and attitudes affect our stress response.  Negative sleep thoughts can worsen your insomnia. They can lead to greater fear or anxiety about sleep, which in turn can aggravate your sleep problem. Thinking more positively and realistically about your sleep promotes its health and healing.     Myths about Sleep    ? People need 8 hours of sleep     This is untrue.  Sleep needs vary from person to person.  Most people need between 6-8 hours to feel alert during the day.  People who sleep 7 hours live longer than  those who sleep 8 hours.  Research also suggests people who sleep 5 hours live longer than those who sleep 9 hours    ? Insomnia is the same as sleep deprivation    Sleep deprivation is lack of sleep due to not allowing enough time for sleep.  This is typically not true for insomnia where people have enough time for sleep and even extend their sleep time trying to get more sleep.  Most people with insomnia get about the same amount of sleep as normal sleepers.  The difference is that with insomnia the sleep is fragmented and less consolidated.    You are Getting More Sleep than You Think    Research using objective sleep tests reveal that people with insomnia get an average of one hour more sleep than they think. This is due in part because the brain misperceives Stage 1 and 2 sleep as being awake.  In addition, stress and arousal while awake in bed changes the brain's perception of time awake.    Examples of Unhealthy Sleep Thoughts    Negative sleep thoughts can have a profound impact on your ability to get a good night's sleep. Below are some examples of negative thoughts associated with insomnia that may sound familiar to you:    ? I must get 8 hours of sleep to function during the day.  ? I won't get to sleep tonight.  ? Insomnia is going to cause health problems.  ? I am dreading going to bed.  ? I woke up early again.  I know I won't get back to sleep.  ? The reason I feel terrible today is because of my insomnia.  ? I've totally lost control of my sleep.  ? I can't sleep without a sleeping pill.    Negative thoughts usually occur automatically and feel like a knee-jerk reaction.  They are often untrue or distorted, especially late in the evening as you become increasingly tired and others are asleep.      Changing Unhealthy Sleep Thoughts    Now that you understand the impact that negative thoughts can have on your sleep, you are ready to change these unhelpful thoughts.  The strategy is powerful and simple:   By recognizing and replacing your negative thoughts about sleep with more accurate, positive thoughts, you will be less anxious and frustrated about your sleep. A more realistic and positive attitude about sleep will allow you to relax and sleep more easily through the night.           There are several important steps involved in changing your unhelpful and negative thoughts about sleep:            Examples of Healthy Sleep Thoughts    ? My work will not suffer much if I have a poor night's sleep.    ? I'm probably getting more sleep than I think.    ? Other things than my sleep affect my daytime functioning.    ? Because I didn't sleep well last night, I am more likely to sleep well tonight because of increased sleep drive that leads to deeper sleep.    ? Sleep requirements vary from one person to another.    ? If I don't sleep well, most of the time the worst that can happen is that my mood might not be as bright the next day.    ? If I awaken after about 5 hours of sleep, I have gotten the core sleep I need for the day.    ? I'm more likely to fall asleep the longer I've been awake    ? I'm more likely to fall asleep as my body temperature begins to decrease through the night.    ? My body's wakefulness system takes charge during the day to promote daytime functioning.    ? These sleep skills have worked for others, and they can work for me.    Other Recommendations for Changing Beliefs and Attitudes   About Your Sleep    ? Keep Realistic Expectations     There is a widespread belief that 8 hours of sleep is necessary to feel refreshed and function well during the day. Many believe that good sleep means never waking up at night.  Others come to expect that they should always wake up in the morning feeling full of energy.  Concerns may arise when your actual sleep falls short of these expectations. Try to avoid placing undue pressure on yourself to achieve certain sleep levels.  It only increases anxiety about  sleeping.  Focus on quality sleep not quantity of sleep.    ? Revise Your Thoughts about the Causes of Insomnia      There is a natural tendency to attribute our sleep problems completely to external factors such as a chemical imbalance, pain, aging or things over which we may have little control.  Although these factors may contribute to your insomnia, research shows CBT-I is beneficial even if they are present.    ? Don't Blame Insomnia for All Daytime Impairments      Many individuals blame insomnia for every symptom or concern they experience during the day from fatigue to lack of concentration. Though poor sleep may produce some of these symptoms, it us usually untrue that all daytime impairment is attributable to insomnia.  It is more often that other factors such as stress and co-occurring medical problems contribute more to how you feel during the day.      ? Don't Catastrophize      Catastrophic thinking means making a sleep mountain out of a molehill.  Some people believe poor sleep will have catastrophic consequences to their physical health, mental health and appearance. Others see insomnia as a complete loss of control.  These perceived consequences of insomnia often prompt people to seek medication or other treatment.  Keep in mind insomnia can be very unpleasant but for the most part is not dangerous.    ? Don't Focus on Sleep     Some people reduce their activity level because of poor sleep.  Although sleep is a necessary part of life, don't make it the focus of your thoughts and concern. Trust that if you engage in health sleep habits, your body will give you the sleep it needs.  Make sure you continue your normal activities despite your insomnia.    ? Never Try to Sleep      Of all the habits the most important one is this:  Never try to force yourself to sleep.  Doing so usually backfires and makes things worse. Instead, focus on keeping to your prescribed sleep schedule and practicing your five  core

## 2020-02-07 ASSESSMENT — ANXIETY QUESTIONNAIRES: GAD7 TOTAL SCORE: 0

## 2020-02-21 ENCOUNTER — APPOINTMENT (OUTPATIENT)
Dept: SLEEP MEDICINE | Facility: CLINIC | Age: 50
End: 2020-02-21
Payer: COMMERCIAL

## 2020-02-21 NOTE — PROGRESS NOTES
The acti-watch was returned but the data was not collected properly. The pt let Adriana Maloney (FD) know that his dog had chewed on the actiwatch. I was unable to get any download info from the acti watch.     Sonya Rosales MA on 2/21/2020 at 8:36 AM

## 2020-02-25 ENCOUNTER — TELEPHONE (OUTPATIENT)
Dept: SLEEP MEDICINE | Facility: CLINIC | Age: 50
End: 2020-02-25

## 2020-02-25 NOTE — TELEPHONE ENCOUNTER
I see after I have scheduled this patient for his sleep study his actigraphy watch didn't work. Did someone call the patient are we getting direction on what the next step is? I went ahead and scheduled him for his in lab sleep study.     Please advise.      Adriana Maloney

## 2020-02-25 NOTE — TELEPHONE ENCOUNTER
His actiwatch did not work because his dog chewed it and broke it completely. I sent an email to Vashti Bartholomew on how to proceed.     Sonya Rosales MA on 2/25/2020 at 3:52 PM

## 2020-08-31 ENCOUNTER — TELEPHONE (OUTPATIENT)
Dept: FAMILY MEDICINE | Facility: CLINIC | Age: 50
End: 2020-08-31

## 2020-08-31 NOTE — TELEPHONE ENCOUNTER
Received Application for Disability Certificate form. Placed in Dr Dumont's red folder.  Riri Hoyt [facility]  2nd Floor  Primary Care

## 2020-09-01 NOTE — TELEPHONE ENCOUNTER
Video visit scheduled for 9/3/2020 at 9:40 am.  Riri Hoyt [facility]  2nd Floor  Primary Care

## 2020-09-03 NOTE — TELEPHONE ENCOUNTER
Appointment for today was cancelled it seems. Now scheduled for 9/8/2020. Will complete the forms then.   Sunita Dumont MD MPH

## 2020-09-04 ENCOUNTER — MYC MEDICAL ADVICE (OUTPATIENT)
Dept: FAMILY MEDICINE | Facility: CLINIC | Age: 50
End: 2020-09-04

## 2020-09-04 NOTE — TELEPHONE ENCOUNTER
Noted, will address this during an actual visit. OK to close encounter.   Sunita Dumont MD MPH    · Continue lisinopril 5 mg daily

## 2020-09-08 ENCOUNTER — VIRTUAL VISIT (OUTPATIENT)
Dept: FAMILY MEDICINE | Facility: CLINIC | Age: 50
End: 2020-09-08
Payer: COMMERCIAL

## 2020-09-08 DIAGNOSIS — G80.1 SPASTIC DIPLEGIC CEREBRAL PALSY (H): Primary | ICD-10-CM

## 2020-09-08 PROCEDURE — 99213 OFFICE O/P EST LOW 20 MIN: CPT | Mod: 95 | Performed by: PREVENTIVE MEDICINE

## 2020-09-08 NOTE — PROGRESS NOTES
"Dakota Wilkinson is a 50 year old male who is being evaluated via a billable video visit.      The patient has been notified of following:     \"This video visit will be conducted via a call between you and your physician/provider. We have found that certain health care needs can be provided without the need for an in-person physical exam.  This service lets us provide the care you need with a video conversation.  If a prescription is necessary we can send it directly to your pharmacy.  If lab work is needed we can place an order for that and you can then stop by our lab to have the test done at a later time.    Video visits are billed at different rates depending on your insurance coverage.  Please reach out to your insurance provider with any questions.    If during the course of the call the physician/provider feels a video visit is not appropriate, you will not be charged for this service.\"    Patient has given verbal consent for Video visit? Yes  How would you like to obtain your AVS? MyChart  If you are dropped from the video visit, the video invite should be resent to: Text to cell phone: [phone]  Will anyone else be joining your video visit? No      Subjective     Dakota Wilkinson is a 50 year old male who presents today via video visit for the following health issues:    HPI    Patient is requesting to get handicapped licence plates.  Steffi,CMA    Forms previously dropped off at the clinic  Needs to have it mailed directly, he provided us with a stamped envelope  Uses a cane to ambulate  Does not drive himself  Has not had a handicapped parking tag in the past  Needs 5 year or longer   Otherwise stable with no other concerns       Video Start Time: 1:08 PM      Review of Systems   Constitutional, HEENT, cardiovascular, pulmonary, gi and gu systems are negative, except as otherwise noted.      Objective           Vitals:  No vitals were obtained today due to virtual visit.    Physical Exam     GENERAL: " Healthy, alert and no distress  EYES: Eyes grossly normal to inspection.  No discharge or erythema, or obvious scleral/conjunctival abnormalities.  RESP: No audible wheeze, cough, or visible cyanosis.  No visible retractions or increased work of breathing.    SKIN: Visible skin clear. No significant rash, abnormal pigmentation or lesions.  NEURO: Cranial nerves grossly intact.  Mentation and speech appropriate for age.  PSYCH: Mentation appears normal, affect normal/bright, judgement and insight intact, normal speech and appearance well-groomed.      No results found for this or any previous visit (from the past 24 hour(s)).        Assessment & Plan     Diagnoses and all orders for this visit:    Spastic diplegic cerebral palsy (H)    -will complete forms as requested  -copy for medical records.          Return in about 6 months (around 3/8/2021) for Routine Visit.    Sunita Dumont MD MPH    [facility]      Video-Visit Details    Type of service:  Video Visit    Video End Time:1:15 PM    Originating Location (pt. Location): Home    Distant Location (provider location):  [facility]     Platform used for Video Visit: Glen

## 2020-09-15 NOTE — TELEPHONE ENCOUNTER
Received signed form. Mailing to [place] [employer] in enclosed envelope. This will go out tomorrow. Copy to TC and abstracting.  Riri Hoyt [facility]  2nd Floor  Primary Care

## 2020-10-14 ENCOUNTER — TELEPHONE (OUTPATIENT)
Dept: SLEEP MEDICINE | Facility: CLINIC | Age: 50
End: 2020-10-14

## 2020-10-14 NOTE — TELEPHONE ENCOUNTER
Reason for Call:  Other appointment    Detailed comments: patient is calling to schedule a sleep study. Please follow up with patient.    Phone Number Patient can be reached at: Cell number on file:    Telephone Information:   Mobile [phone]       Best Time: anytime     Can we leave a detailed message on this number? YES    Call taken on 10/14/2020 at 9:35 AM by Lizzy Lizama

## 2020-10-30 ENCOUNTER — VIRTUAL VISIT (OUTPATIENT)
Dept: FAMILY MEDICINE | Facility: CLINIC | Age: 50
End: 2020-10-30
Payer: COMMERCIAL

## 2020-10-30 DIAGNOSIS — F51.04 CHRONIC INSOMNIA: ICD-10-CM

## 2020-10-30 DIAGNOSIS — G80.1 SPASTIC DIPLEGIC CEREBRAL PALSY (H): Primary | ICD-10-CM

## 2020-10-30 DIAGNOSIS — I10 ESSENTIAL HYPERTENSION: ICD-10-CM

## 2020-10-30 PROCEDURE — 99214 OFFICE O/P EST MOD 30 MIN: CPT | Mod: 95 | Performed by: PREVENTIVE MEDICINE

## 2020-10-30 RX ORDER — BACLOFEN 10 MG/1
10 TABLET ORAL 3 TIMES DAILY
Qty: 90 TABLET | Refills: 5 | Status: SHIPPED | OUTPATIENT
Start: 2020-10-30

## 2020-10-30 NOTE — PROGRESS NOTES
"Dakota Wilkinson is a 50 year old male who is being evaluated via a billable video visit.      The patient has been notified of following:     \"This video visit will be conducted via a call between you and your physician/provider. We have found that certain health care needs can be provided without the need for an in-person physical exam.  This service lets us provide the care you need with a video conversation.  If a prescription is necessary we can send it directly to your pharmacy.  If lab work is needed we can place an order for that and you can then stop by our lab to have the test done at a later time.    Video visits are billed at different rates depending on your insurance coverage.  Please reach out to your insurance provider with any questions.    If during the course of the call the physician/provider feels a video visit is not appropriate, you will not be charged for this service.\"    Patient has given verbal consent for Video visit? Yes  How would you like to obtain your AVS? MyChart  Will anyone else be joining your video visit? No      Subjective     Dakota Wilkinson is a 50 year old male who presents today via video visit for the following health issues:    HPI     Hypertension Follow-up      Do you check your blood pressure regularly outside of the clinic? Not usually    Are you following a low salt diet? Yes    Are your blood pressures ever more than 140 on the top number (systolic) OR more   than 90 on the bottom number (diastolic), for example 140/90? No      Medication Followup of baclofen     Taking Medication as prescribed: NO, takes three times a day instead of listed 2 times a day     Side Effects:  None    Medication Helping Symptoms:  yes     Video Start Time: 1:05 PM    Review of Systems   Constitutional, HEENT, cardiovascular, pulmonary, gi and gu systems are negative, except as otherwise noted.      Objective    Vitals - Patient Reported  Systolic (Patient Reported): 130  Diastolic (Patient " Reported): 70      Vitals:  No vitals were obtained today due to virtual visit.    Physical Exam     GENERAL: Healthy, alert and no distress  EYES: Eyes grossly normal to inspection.  No discharge or erythema, or obvious scleral/conjunctival abnormalities.  RESP: No audible wheeze, cough, or visible cyanosis.  No visible retractions or increased work of breathing.    SKIN: Visible skin clear. No significant rash, abnormal pigmentation or lesions.  NEURO: Cranial nerves grossly intact.  Mentation and speech appropriate for age.  PSYCH: Mentation appears normal, affect normal/bright, judgement and insight intact, normal speech and appearance well-groomed.      No results found for this or any previous visit (from the past 24 hour(s)).        Assessment & Plan     Diagnoses and all orders for this visit:    Spastic diplegic cerebral palsy (H)  -     baclofen (LIORESAL) 10 MG tablet; Take 1 tablet (10 mg) by mouth 3 times daily  -     CBC with platelets differential; Future  -     Comprehensive metabolic panel; Future  -     TSH with free T4 reflex; Future  -refills on Baclofen provided    Essential hypertension  -     CBC with platelets differential; Future  -     Comprehensive metabolic panel; Future  -     TSH with free T4 reflex; Future  -     Lipid panel reflex to direct LDL Fasting; Future  -     Albumin Random Urine Quantitative with Creat Ratio; Future  -has lost 30 pounds in the last year  -has been going to the gym   -stopped all blood pressure medications    Chronic insomnia  -managed by the Sleep clinic           Return in about 1 week (around 11/6/2020) for labs.    Sunita Dumont MD MPH    [facility]      Video-Visit Details    Type of service:  Video Visit    Video End Time:1:15 PM    Originating Location (pt. Location): Home    Distant Location (provider location):  [facility]     Platform used for Video Visit: Performable

## 2020-11-11 ENCOUNTER — TELEPHONE (OUTPATIENT)
Dept: SLEEP MEDICINE | Facility: CLINIC | Age: 50
End: 2020-11-11

## 2020-11-11 DIAGNOSIS — F51.04 CHRONIC INSOMNIA: ICD-10-CM

## 2020-11-11 NOTE — TELEPHONE ENCOUNTER
Left message to let pt know that we will be short a tech the night of his study.  Asked him to return my call to possibly reschedule to [place].

## 2020-12-06 ENCOUNTER — THERAPY VISIT (OUTPATIENT)
Dept: SLEEP MEDICINE | Facility: CLINIC | Age: 50
End: 2020-12-06
Payer: COMMERCIAL

## 2020-12-06 DIAGNOSIS — I10 ESSENTIAL HYPERTENSION: Chronic | ICD-10-CM

## 2020-12-06 DIAGNOSIS — G47.9 DISTURBANCE IN SLEEP BEHAVIOR: ICD-10-CM

## 2020-12-06 DIAGNOSIS — G47.00 INSOMNIA, UNSPECIFIED TYPE: ICD-10-CM

## 2020-12-06 DIAGNOSIS — R06.00 DYSPNEA AND RESPIRATORY ABNORMALITY: ICD-10-CM

## 2020-12-06 DIAGNOSIS — R53.83 MALAISE AND FATIGUE: ICD-10-CM

## 2020-12-06 DIAGNOSIS — R06.89 DYSPNEA AND RESPIRATORY ABNORMALITY: ICD-10-CM

## 2020-12-06 DIAGNOSIS — R53.81 MALAISE AND FATIGUE: ICD-10-CM

## 2020-12-06 PROCEDURE — 95810 POLYSOM 6/> YRS 4/> PARAM: CPT | Performed by: INTERNAL MEDICINE

## 2020-12-06 NOTE — Clinical Note
Large continuous block of mild hypoxia late in study in left lateral REM, started with position change and terminated after movement/arousal suspect artifact, looks like oximeter on left finger, would have been worth a little trouble shooting

## 2020-12-07 NOTE — PROCEDURES
" SLEEP STUDY INTERPRETATION  DIAGNOSTIC POLYSOMNOGRAPHY REPORT      Patient: DENEEN FLORES  YOB: 1970  Study Date: 12/6/2020  MRN: [id number]  Referring Provider: Wilmar Gallego MD  Ordering Provider: PARVIN Osman MD    Indications for Polysomnography: The patient is a 50 year old Male who is 5' 5\" and weighs 140.0 lbs. His BMI is 23.3, [place] sleepiness scale 3 and neck circumference is 40 cm. A diagnostic polysomnogram was performed to evaluate for mild snoring, fatigue (ESS 2), sleep maintenance difficulties, no current bedpartner, crowded oropharynx. Comorbid hypertension    Polysomnogram Data: A full night polysomnogram recorded the standard physiologic parameters including EEG, EOG, EMG, ECG, nasal and oral airflow. Respiratory parameters of chest and abdominal movements were recorded with respiratory inductance plethysmography. Oxygen saturation was recorded by pulse oximetry. Hypopnea scoring rule used: 1B 4%.    Sleep Architecture:   The total recording time of the polysomnogram was 507.0 minutes. The total sleep time was 430.0 minutes. Sleep latency was decreased at 6.0 minutes with the use of a sleep aid (zolpidem). REM latency was 187.5 minutes. Arousal index was normal at 7.5 arousals per hour. Sleep efficiency was normal at 84.8%. Wake after sleep onset was 71.0 minutes. The patient spent 7.4% of total sleep time in Stage N1, 41.6% in Stage N2, 28.3% in Stage N3, and 22.7% in REM. Time in REM supine was 17.0 minutes.      Respiration:     Events ? The polysomnogram revealed a presence of 0 obstructive, 1 central, and 0 mixed apneas resulting in an apnea index of 0.1 events per hour. There were 18 obstructive hypopneas and 0 central hypopneas resulting in an obstructive hypopnea index of 2.5 and central hypopnea index of 0 events per hour. The combined apnea/hypopnea index was 2.7 events per hour (central apnea/hypopnea index was 0.1 events per hour). The REM AHI was 1.2 events per hour. " The supine AHI was 6.3 events per hour. The RERA index was 0.8 events per hour.  The RDI was 3.5 events per hour.    Snoring - was reported moderate to loud and intermittent.    Respiratory rate and pattern - was notable for normal respiratory rate and pattern.    Sustained Sleep Associated Hypoventilation - Transcutaneous carbon dioxide monitoring was not used, however significant hypoventilation was not suggested by oximetry    Sleep Associated Hypoxemia - (Greater than 5 minutes O2 sat at or below 88%) was present. Baseline oxygen saturation was 93.2%. Lowest oxygen saturation was 77.6%. Time spent less than or equal to 88% was 50.6 minutes. Time spent less than or equal to 89% was 52.6 minutes. The desaturations occurred in one large block late in the study and after a position change. Artifact is suspected.     Movement Activity:     Periodic Limb Activity - There were 999 PLMs during the entire study. The PLM index was 139.4 movements per hour. The PLM Arousal Index was 4.6 per hour.    REM EMG Activity - Excessive transient/sustained muscle activity was not present.    Nocturnal Behavior - Abnormal sleep related behaviors were not noted     Bruxism - None apparent.      Cardiac Summary:   The average pulse rate was 58.4 bpm. The minimum pulse rate was 45.9 bpm while the maximum pulse rate was 100.8 bpm.  Arrhythmias were not noted.        Assessment:     No evidence of significant obstructive sleep apnea, though 3 events in REM supine were associated with significant brief desaturations.    Hypoxemia likely due to artifact    Frequent periodic limb movements of sleep    Recommendations:    Consider a trial of positional therapy    Suggest optimizing sleep schedule     Could consider a trial of dopaminergic agents to see if it helps sleep maintenance difficulties.       Diagnostic Codes:   Snoring     _____________________________________   Electronically Signed By: Butch Osman MD 12/7/20           Range(%)  Time in range (min)   0.0 - 89.0 52.6   0.0 - 88.0 50.6         Stage Min(mm Hg) Max(mm Hg)   Wake - -   NREM(1+2+3) - -   REM - -       Range(mmHg) Time in range (min)   55.0 - 100.0 -   Excluded data <20.0 & >65.0 507.5

## 2020-12-18 NOTE — PROGRESS NOTES
"Dakota Wilkinson is a 50 year old male who is being evaluated via a billable video visit.      The patient has been notified of following:     \"This video visit will be conducted via a call between you and your physician/provider. We have found that certain health care needs can be provided without the need for an in-person physical exam.  This service lets us provide the care you need with a video conversation.  If a prescription is necessary we can send it directly to your pharmacy.  If lab work is needed we can place an order for that and you can then stop by our lab to have the test done at a later time.    Video visits are billed at different rates depending on your insurance coverage.  Please reach out to your insurance provider with any questions.    If during the course of the call the physician/provider feels a video visit is not appropriate, you will not be charged for this service.\"    Patient has given verbal consent for Video visit? Yes  How would you like to obtain your AVS? MyChart  If you are dropped from the video visit, the video invite should be resent to: Send to e-mail at: [email or web address]  Will anyone else be joining your video visit? No        Video-Visit Details    Type of service:  Video Visit    Video Start Time: 10:00 AM  Video End Time: 10:24 AM    Originating Location (pt. Location): Home    Distant Location (provider location):  [facility]     Platform used for Video Visit: [facility]          Sleep Study Follow-Up Visit:    Date on this visit: 12/21/2020    Dakota Wilkinson for follow-up of his sleep study done for mild snoring, fatigue (ESS 2), sleep maintenance difficulties, no current bedpartner, crowded oropharynx. Comorbid hypertension.   - Sleep onset, maintenance difficulties. Suspect primary problem is Psychophysiologic insomnia. Patient has been seen by Dr. Heredia      Study Date: 12/6/2020 (140.0 lbs)   Sleep Architecture:   The total " recording time of the polysomnogram was 507.0 minutes. The total sleep time was 430.0 minutes. Sleep latency was decreased at 6.0 minutes with the use of a sleep aid (zolpidem). REM latency was 187.5 minutes. Arousal index was normal at 7.5 arousals per hour. Sleep efficiency was normal at 84.8%. Wake after sleep onset was 71.0 minutes. The patient spent 7.4% of total sleep time in Stage N1, 41.6% in Stage N2, 28.3% in Stage N3, and 22.7% in REM. Time in REM supine was 17.0 minutes.     Respiration:     Events ? The polysomnogram revealed a presence of 0 obstructive, 1 central, and 0 mixed apneas resulting in an apnea index of 0.1 events per hour. There were 18 obstructive hypopneas and 0 central hypopneas resulting in an obstructive hypopnea index of 2.5 and central hypopnea index of 0 events per hour. The combined apnea/hypopnea index was 2.7 events per hour (central apnea/hypopnea index was 0.1 events per hour). The REM AHI was 1.2 events per hour. The supine AHI was 6.3 events per hour. The RERA index was 0.8 events per hour.  The RDI was 3.5 events per hour.    Snoring - was reported moderate to loud and intermittent.    Respiratory rate and pattern - was notable for normal respiratory rate and pattern.    Sustained Sleep Associated Hypoventilation - Transcutaneous carbon dioxide monitoring was not used, however significant hypoventilation was not suggested by oximetry    Sleep Associated Hypoxemia - (Greater than 5 minutes O2 sat at or below 88%) was present. Baseline oxygen saturation was 93.2%. Lowest oxygen saturation was 77.6%. Time spent less than or equal to 88% was 50.6 minutes. Time spent less than or equal to 89% was 52.6 minutes. The desaturations occurred in one large block late in the study and after a position change. Artifact is suspected.      Movement Activity:     Periodic Limb Activity - There were 999 PLMs during the entire study. The PLM index was 139.4 movements per hour. The PLM Arousal  Index was 4.6 per hour.    REM EMG Activity - Excessive transient/sustained muscle activity was not present.    Nocturnal Behavior - Abnormal sleep related behaviors were not noted     Bruxism - None apparent.    Cardiac Summary:   The average pulse rate was 58.4 bpm. The minimum pulse rate was 45.9 bpm while the maximum pulse rate was 100.8 bpm.  Arrhythmias were not noted.       These findings were reviewed with patient.     Bedtime 2400, gets up at [id number].   He denies activities in bed  He has sleep maintenance difficulties. He awakens 1/night. He has trouble falling back to sleep most nights due to unclear reasons. He does gets out of bed and walks around.   He naps 2-3/week for 30-45 minutes  He uses no caffeine    Total score - [place]: 3 (12/17/2020 12:21 AM)        Past medical/surgical history, family history, social history, medications and allergies were reviewed.      Problem List:  Patient Active Problem List    Diagnosis Date Noted     Spastic diplegic cerebral palsy (H) 10/15/2019     Priority: High     Migraines      Priority: Medium     Essential hypertension 10/15/2019     Priority: Medium     Chronic insomnia 01/23/2020     Priority: Low     Excessive sweating 01/22/2020     Priority: Low        Impression/Plan:    No evidence of significant obstructive sleep apnea, though 3 events in REM supine were associated with significant brief desaturations. Hypoxemia likely due to artifact  - Consider a trial of positional therapy  - Offered further confirmation of normal O2 with HSAT     Frequent periodic limb movements of sleep  - Offered trial of mirapex, he is interested in a trial     Insomnia  Reports initially had good response with cognitive behavioral training, but since Covid 19 has worsened again  - Referred for cognitive behavioral training again   - We discussed stimulus control     He will follow up with me in about 6 week(s).     Twenty-five minutes spent with patient

## 2020-12-21 ENCOUNTER — VIRTUAL VISIT (OUTPATIENT)
Dept: SLEEP MEDICINE | Facility: CLINIC | Age: 50
End: 2020-12-21
Payer: COMMERCIAL

## 2020-12-21 DIAGNOSIS — F51.04 CHRONIC INSOMNIA: Primary | ICD-10-CM

## 2020-12-21 DIAGNOSIS — G47.61 PERIODIC LIMB MOVEMENT DISORDER (PLMD): ICD-10-CM

## 2020-12-21 PROCEDURE — 99214 OFFICE O/P EST MOD 30 MIN: CPT | Mod: 95 | Performed by: INTERNAL MEDICINE

## 2020-12-21 RX ORDER — PRAMIPEXOLE DIHYDROCHLORIDE 0.12 MG/1
0.12 TABLET ORAL AT BEDTIME
Qty: 60 TABLET | Refills: 1 | Status: SHIPPED | OUTPATIENT
Start: 2020-12-21 | End: 2021-02-02

## 2020-12-21 NOTE — NURSING NOTE
LVMTCB and schedule 6 week follow up.  Request for Yan appointment sent to Richard Bloch for scheduling.

## 2020-12-21 NOTE — PATIENT INSTRUCTIONS
Positioning Device manuel, danielito bump, tennis ball tshirt, back pack with a pillow stuffed in it.   This example shows a pillow that straps around the waist. It may be appropriate for those whose sleep study shows milder sleep apnea that occurs primarily when lying flat on one's back. Preliminary studies have shown benefit but effectiveness at home should be verified.

## 2021-01-13 VITALS — WEIGHT: 150 LBS | BODY MASS INDEX: 24.99 KG/M2 | HEIGHT: 65 IN

## 2021-01-13 ASSESSMENT — MIFFLIN-ST. JEOR: SCORE: 1472.28

## 2021-01-13 NOTE — PATIENT INSTRUCTIONS
Changing Sleep Habits    Sometimes insomnia can be made worse by our own habits or situation.  You should consider making some of the following changes to help improve your sleep:     If there is excessive noise in your house / neighborhood use a fan or similar device to make a quiet background noise that can drown out other noises    If your room is too bright early in the morning, hang a blanket or extra curtain over the windows to keep the light out or use a sleeping mask to cover your eyes    If your room is too warm during the night, set the temperature in the house down a few degrees for the night    Spend a little time before bedtime trying to relax.  Don t work right up until bedtime.  Take 30-60 minutes to relax and unwind before bedtime with a book or watching TV    Do not drink or eat anything with caffeine in it at least 6 hours before bed.    Avoid alcohol at least three hours before bedtime    Do not smoke right before bedtime or during the night    No strenuous exercise for 2-3 hours before bedtime

## 2021-01-13 NOTE — PROGRESS NOTES
Dakota Wilkinson is a 50 year old who is being evaluated via a billable video visit.      How would you like to obtain your AVS? MyChart  If the video visit is dropped, the invitation should be resent by: Send to e-mail at: [email or web address]  Will anyone else be joining your video visit? No    Video Start Time: 1:06 PM  Type of service:  Video visit  Video End Time:1:40 PM        Originating Location (pt. Location): Home    Distant Location (provider location):  [facility]     Platform used for Video Visit: Glen RICE CMA, [facility], 1/13/2021 1:51 PM    SLEEP MEDICINE CONSULTATION  Sleep Psychology    Name: Dakota Wilkinson MRN# [id number]   Age: 50 year old YOB: 1970     Date of Consultation: Jan 14, 2021  Consultation is requested by: Butch Osman MD  [address] [facility] [address]  [place] [address]  Primary care provider: Sunita Dumont    Reason for Sleep Consultation     Dakota Wilkinson is a 50 year old male seen today for a behavioral sleep medicine consultation because of insomnia    Assessment and Plan     Sleep Diagnoses/Recommendations:       Chronic insomnia  Periodic limb movement disorder (PLMD)    Dakota patient was referred for possible behavioral sleep interventions for insomnia identified and associated with periodic limb movement disorder.  After confirmation with recent sleep study in November 2020 he reports an excellent response to introduction of pramipexole for treatment of PLMD.  A review of sleep diaries and self-report information indicates sleep latency, wake after sleep onset and sleep efficiency appears to be within normal with patient self-report of total sleep time now approaching 6 hours.  He reports marked improvement in daytime functioning.  We reviewed sleep hygiene strategies as well as behavioral strategies for managing restless leg and.  Limb movements disorder with consideration of his cooccurring cerebral  palsy.  He will follow up with my colleague, Dr. Butch Osman for management of PLMD.  Summary Counseling:      Dakota was provided information about the pathophysiology of insomnia and psychophysiological factors contributing to the onset and maintenance of insomnia.  Treatment options were discussed including component of cognitive-behavioral therapy for insomnia. The benefits and potential early side effects of treatment including increased daytime sleepiness were discussed.       Patient was advised to consult with their prescribing provider around use of or changes to prescription sleep medication.  Patient was counseled on the importance of avoiding driving if drowsy.    See patient instructions for initial treatment recommendations and behavioral sleep plan.    Follow-up: as needed if symptoms recur     History of Present Sleep Complaint     Dakota Wilkinson is a 50 year old year old male with a relevant medical history of  PLMD, chronic pain who presents with longstanding history of non restorative sleep.  He was diagnosed with PLMD in Dec 2020.  He was placed on Pramipexole and reports significant improvement of symptoms.    Prior to initiation of treatment with pramipexole, patient reports marked sleep fragmentation, exhaustion, fatigue and daytime sleepiness.  He reports with treatment he feels much better during the day and is not reporting any daytime sleepiness.  He subjectively experienced of multiple awakenings at night and now reports that he may wake up 1-2 times a night to go to the bathroom but is able to fall back asleep quickly.  A majority of the mornings he states that he wakes up feeling refreshed.    Patient reports that his pain is fairly well controlled with baclofen.    .Precipitants to onset of insomnia: None known, onset about 27      Current Sleep Medication/OTCs:  pramipexole    Previous Medication/OTCs:  Melatonin    Behavioral Strategies  None reported    Sleep/Wake Pattern    Shift Work  "- Retired    Source of Sleep Estimates:  verbal self-report    Dakota Wilkinson       On weekdays/workdays goes to bed at 11 PM without prominent restlessness prior to bed ;      Gets up for the day at 5 AM  without an alarm;       On weekends/days off keeps the same wake time;      Falls asleep in about 45  minutes and has difficulty falling asleep;       Awakens 1-2 times a night due to spontaneous arousal, use of bathroom       Average estimated time awake during the night  is  5 minutes.      Average time in bed estimated to be 6 hours.      Average total sleep time estmiated to be 5.5 hours.      Naps - 4-5 per week, 30-60 minutes    Sleep Habits and Behavior:    none reported    Sleep Environment:    Bedroom is quiet, comfortable and dark, Sleeps alone    Substance Use:    Caffeine: 1-2 beverages  Alcohol: Rare  Nicotine: None  Recreational/Illicit Drugs: None reported      Previous Sleep Studies/Consultation:    Study Date: 12/6/2020  MRN: [id number]  Referring Provider: Wilmar Gallego MD  Ordering Provider: PARVIN Osman MD     Indications for Polysomnography: The patient is a 50 year old Male who is 5' 5\" and weighs 140.0 lbs. His BMI is 23.3, [place] sleepiness scale 3 and neck circumference is 40 cm. A diagnostic polysomnogram was performed to evaluate for mild snoring, fatigue (ESS 2), sleep maintenance difficulties, no current bedpartner, crowded oropharynx. Comorbid hypertension     Polysomnogram Data: A full night polysomnogram recorded the standard physiologic parameters including EEG, EOG, EMG, ECG, nasal and oral airflow. Respiratory parameters of chest and abdominal movements were recorded with respiratory inductance plethysmography. Oxygen saturation was recorded by pulse oximetry. Hypopnea scoring rule used: 1B 4%.     Sleep Architecture:   The total recording time of the polysomnogram was 507.0 minutes. The total sleep time was 430.0 minutes. Sleep latency was decreased at 6.0 minutes with the use " of a sleep aid (zolpidem). REM latency was 187.5 minutes. Arousal index was normal at 7.5 arousals per hour. Sleep efficiency was normal at 84.8%. Wake after sleep onset was 71.0 minutes. The patient spent 7.4% of total sleep time in Stage N1, 41.6% in Stage N2, 28.3% in Stage N3, and 22.7% in REM. Time in REM supine was 17.0 minutes.        Respiration:     Events ? The polysomnogram revealed a presence of 0 obstructive, 1 central, and 0 mixed apneas resulting in an apnea index of 0.1 events per hour. There were 18 obstructive hypopneas and 0 central hypopneas resulting in an obstructive hypopnea index of 2.5 and central hypopnea index of 0 events per hour. The combined apnea/hypopnea index was 2.7 events per hour (central apnea/hypopnea index was 0.1 events per hour). The REM AHI was 1.2 events per hour. The supine AHI was 6.3 events per hour. The RERA index was 0.8 events per hour.  The RDI was 3.5 events per hour.    Snoring - was reported moderate to loud and intermittent.    Respiratory rate and pattern - was notable for normal respiratory rate and pattern.    Sustained Sleep Associated Hypoventilation - Transcutaneous carbon dioxide monitoring was not used, however significant hypoventilation was not suggested by oximetry    Sleep Associated Hypoxemia - (Greater than 5 minutes O2 sat at or below 88%) was present. Baseline oxygen saturation was 93.2%. Lowest oxygen saturation was 77.6%. Time spent less than or equal to 88% was 50.6 minutes. Time spent less than or equal to 89% was 52.6 minutes. The desaturations occurred in one large block late in the study and after a position change. Artifact is suspected.      Movement Activity:     Periodic Limb Activity - There were 999 PLMs during the entire study. The PLM index was 139.4 movements per hour. The PLM Arousal Index was 4.6 per hour.    REM EMG Activity - Excessive transient/sustained muscle activity was not present.    Nocturnal Behavior - Abnormal sleep  "related behaviors were not noted     Bruxism - None apparent.        Cardiac Summary:   The average pulse rate was 58.4 bpm. The minimum pulse rate was 45.9 bpm while the maximum pulse rate was 100.8 bpm.  Arrhythmias were not noted.           Assessment:     No evidence of significant obstructive sleep apnea, though 3 events in REM supine were associated with significant brief desaturations.    Hypoxemia likely due to artifact    Frequent periodic limb movements of sleep     Recommendations:    Consider a trial of positional therapy    Suggest optimizing sleep schedule     Could consider a trial of dopaminergic agents to see if it helps sleep maintenance difficulties.        Screening          [place] Sleepiness Scale  Total score - [place]: 6 .    HENNA Total Score: 26       PHQ-9 SCORE 2/6/2020   PHQ-9 Total Score 6       VICKY-7 SCORE 2/6/2020   Total Score 0         Vitals     Ht 1.651 m (5' 5\")   Wt 68 kg (150 lb)   BMI 24.96 kg/m       Medical History     Patient Active Problem List   Diagnosis     Spastic diplegic cerebral palsy (H)     Essential hypertension     Migraines     Excessive sweating     Chronic insomnia        Current Outpatient Medications   Medication Sig Dispense Refill     baclofen (LIORESAL) 10 MG tablet Take 1 tablet (10 mg) by mouth 3 times daily 90 tablet 5     glycopyrrolate (ROBINUL) 1 MG tablet Take 1 mg by mouth 3 times daily   0     pramipexole (MIRAPEX) 0.125 MG tablet Take 1 tablet (0.125 mg) by mouth At Bedtime 60 tablet 1       Past Surgical History:   Procedure Laterality Date     ORTHOPEDIC SURGERY  07/14/2017    IM ZARA TIBIA 7/14/2017 Leg Lower/LeftProcedure: IM ZARA LEFT TIBIA; Laterality: Left; Surgeon: Zaki Bonilla MD; Service: Orthopedics       ORTHOPEDIC SURGERY Bilateral 1993    multiple arthroscopies early 1990s     ORTHOPEDIC SURGERY Bilateral 1993    nerve 'tranposition'? both legs          Allergies   Allergen Reactions     Amoxicillin      Codeine      Morphine " Unknown     Penicillins      Percocet [Oxycodone-Acetaminophen]          Mental Health History     Prior Mental Health Diagnosis: None reported    Current Mental Health Treatment: None reported    Prior Chemical Dependency Treatment:  None reported      Family History     Family History   Problem Relation Age of Onset     Diabetes Maternal Grandmother      Cerebrovascular Disease Maternal Grandmother      Dementia Maternal Grandmother      Cancer Maternal Grandfather      Diabetes Paternal Grandmother      Cerebrovascular Disease Paternal Grandmother      Colon Cancer Paternal Grandmother      Prostate Cancer Paternal Grandmother      Other Cancer Paternal Grandmother        Family History of Sleep Disorders: None reported  Social History     Social History     Socioeconomic History     Marital status:      Spouse name: None     Number of children: None     Years of education: None     Highest education level: None   Occupational History     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[employer]   Social Needs     Financial resource strain: None     Food insecurity     Worry: None     Inability: None     Transportation needs     Medical: None     Non-medical: None   Tobacco Use     Smoking status: Former Smoker     Packs/day: 0.00     Years: 0.00     Pack years: 0.00     Smokeless tobacco: Never Used   Substance and Sexual Activity     Alcohol use: Yes     Frequency: 2-4 times a month     Comment: socially     Drug use: Never     Sexual activity: Not Currently   Lifestyle     Physical activity     Days per week: None     Minutes per session: None     Stress: None   Relationships     Social connections     Talks on phone: None     Gets together: None     Attends Congregational service: None     Active member of club or organization: None     Attends meetings of clubs or organizations: None     Relationship status: None     Intimate partner violence     Fear of current or ex partner: None      Emotionally abused: None     Physically abused: None     Forced sexual activity: None   Other Topics Concern     Parent/sibling w/ CABG, MI or angioplasty before 65F 55M? No   Social History Narrative     None        Mental Status Examination     Dakota presented as appropriately dressed and groomed and was oriented X3 with speech language intact.  The patient was cooperative throughout the evaluation with no signs of hallucinations, delusions, loosening of associations or other thought disturbance.  Mood was normal Affect was congruent with mood. Insight and judgement were intact.  Memory appeared intact for recent and remote elements.  There was no report of suicidal ideation, intention or plan. Attention and concentration were within normal.      Copy:   Sunita Dumont MD  [address]  [place] [address]    Anatoliy Hereida PsyD, CARINE, [facility]  Diplomate, Behavioral Sleep Medicine  [facility]    Note: This dictation was created using voice recognition software. This document may contain an error not identified before finalizing the document. If the error changes the accuracy of the document, I would appreciate it being brought to my attention.

## 2021-01-14 ENCOUNTER — VIRTUAL VISIT (OUTPATIENT)
Dept: SLEEP MEDICINE | Facility: CLINIC | Age: 51
End: 2021-01-14
Attending: INTERNAL MEDICINE
Payer: COMMERCIAL

## 2021-01-14 DIAGNOSIS — F51.04 CHRONIC INSOMNIA: ICD-10-CM

## 2021-01-14 DIAGNOSIS — G47.61 PERIODIC LIMB MOVEMENT DISORDER (PLMD): Primary | ICD-10-CM

## 2021-01-14 PROCEDURE — 90791 PSYCH DIAGNOSTIC EVALUATION: CPT | Mod: 95 | Performed by: PSYCHOLOGIST

## 2021-01-14 NOTE — Clinical Note
Butch,  Thanks for sending this patient my way.  He reports responding very well to introduction of pramipexole with self report of more sleep and feels much better during the day. He was very appreciative of your care.  Pj

## 2021-01-15 ENCOUNTER — HEALTH MAINTENANCE LETTER (OUTPATIENT)
Age: 51
End: 2021-01-15

## 2021-02-02 ENCOUNTER — VIRTUAL VISIT (OUTPATIENT)
Dept: SLEEP MEDICINE | Facility: CLINIC | Age: 51
End: 2021-02-02
Payer: COMMERCIAL

## 2021-02-02 VITALS — HEIGHT: 65 IN | WEIGHT: 150 LBS | BODY MASS INDEX: 24.99 KG/M2

## 2021-02-02 DIAGNOSIS — F51.04 CHRONIC INSOMNIA: ICD-10-CM

## 2021-02-02 DIAGNOSIS — G47.61 PERIODIC LIMB MOVEMENT DISORDER (PLMD): ICD-10-CM

## 2021-02-02 PROCEDURE — 99212 OFFICE O/P EST SF 10 MIN: CPT | Mod: 95 | Performed by: INTERNAL MEDICINE

## 2021-02-02 RX ORDER — PRAMIPEXOLE DIHYDROCHLORIDE 0.12 MG/1
0.12 TABLET ORAL AT BEDTIME
Qty: 30 TABLET | Refills: 11 | Status: SHIPPED | OUTPATIENT
Start: 2021-02-02 | End: 2022-01-27

## 2021-02-02 ASSESSMENT — MIFFLIN-ST. JEOR: SCORE: 1472.28

## 2021-02-02 NOTE — PROGRESS NOTES
Dakota Wilkinson is a 50 year old who is being evaluated via a billable video visit.      How would you like to obtain your AVS? MyChart  If the video visit is dropped, the invitation should be resent by: Text to cell phone: [phone]  Will anyone else be joining your video visit? No     Sonya Rosales MA on 2/2/2021 at 10:36 AM        Video Start Time: 10:41 AM       Video-Visit Details    Type of service:  Video Visit    Video End Time:10:50 AM    Originating Location (pt. Location): Home    Distant Location (provider location):  [facility]     Platform used for Video Visit: Media[employer]       Medication Follow-Up Visit:    Chief Complaint   Patient presents with     RECHECK     Medicaton recheck        Dakota Wilkinson comes in today for follow-up of possible periodic limb movement disorder        No specialty comments available.    Overall, the patient reports they are doing good. He reports he is staying asleep better with mirapex 1 pill a night. He reports it also seems to help his muscle spasms at night.   Patient is not having medication side effects.     Total score - [place]: 4 (2/2/2021 10:00 AM)  HENNA Total Score: 20      Past medical/surgical history, family history, social history, medications and allergies were reviewed.      Current Outpatient Medications   Medication Sig Dispense Refill     baclofen (LIORESAL) 10 MG tablet Take 1 tablet (10 mg) by mouth 3 times daily 90 tablet 5     glycopyrrolate (ROBINUL) 1 MG tablet Take 1 mg by mouth 3 times daily   0     pramipexole (MIRAPEX) 0.125 MG tablet Take 1 tablet (0.125 mg) by mouth At Bedtime 60 tablet 1        Problem List:  Patient Active Problem List    Diagnosis Date Noted     Spastic diplegic cerebral palsy (H) 10/15/2019     Priority: High     Migraines      Priority: Medium     Essential hypertension 10/15/2019     Priority: Medium     Chronic insomnia 01/23/2020     Priority: Low     Excessive sweating 01/22/2020  "    Priority: Low        Ht 1.651 m (5' 5\")   Wt 68 kg (150 lb)   BMI 24.96 kg/m        Impression/Plan:    Periodic limb movement disorder  Improved with mirapex. We discussed drug holidays  - Continue current treatments.  - Check Ferritin     Dakota Wilkinson will follow up in about 1 year(s).     I spent 5 minutes with patient including counseling, and 5 minuteschart review, and documentation         "

## 2021-02-02 NOTE — PATIENT INSTRUCTIONS
Your BMI is Body mass index is 24.96 kg/m .  Weight management is a personal decision.  If you are interested in exploring weight loss strategies, the following discussion covers the approaches that may be successful. Body mass index (BMI) is one way to tell whether you are at a healthy weight, overweight, or obese. It measures your weight in relation to your height.  A BMI of 18.5 to 24.9 is in the healthy range. A person with a BMI of 25 to 29.9 is considered overweight, and someone with a BMI of 30 or greater is considered obese. More than two-thirds of American adults are considered overweight or obese.  Being overweight or obese increases the risk for further weight gain. Excess weight may lead to heart disease and diabetes.  Creating and following plans for healthy eating and physical activity may help you improve your health.  Weight control is part of healthy lifestyle and includes exercise, emotional health, and healthy eating habits. Careful eating habits lifelong are the mainstay of weight control. Though there are significant health benefits from weight loss, long-term weight loss with diet alone may be very difficult to achieve- studies show long-term success with dietary management in less than 10% of people. Attaining a healthy weight may be especially difficult to achieve in those with severe obesity. In some cases, medications, devices and surgical management might be considered.  What can you do?  If you are overweight or obese and are interested in methods for weight loss, you should discuss this with your provider.     Consider reducing daily calorie intake by 500 calories.     Keep a food journal.     Avoiding skipping meals, consider cutting portions instead.    Diet combined with exercise helps maintain muscle while optimizing fat loss. Strength training is particularly important for building and maintaining muscle mass. Exercise helps reduce stress, increase energy, and improves fitness.  Increasing exercise without diet control, however, may not burn enough calories to loose weight.       Start walking three days a week 10-20 minutes at a time    Work towards walking thirty minutes five days a week     Eventually, increase the speed of your walking for 1-2 minutes at time    In addition, we recommend that you review healthy lifestyles and methods for weight loss available through the National Institutes of Health patient information sites:  http://win.niddk.nih.gov/publications/index.htm    And look into health and wellness programs that may be available through your health insurance provider, employer, local community center, or fatoumata club.

## 2021-02-15 VITALS — HEIGHT: 65 IN | BODY MASS INDEX: 24.16 KG/M2 | WEIGHT: 145 LBS

## 2021-02-15 ASSESSMENT — MIFFLIN-ST. JEOR: SCORE: 1449.6

## 2021-02-15 NOTE — PATIENT INSTRUCTIONS
Your BMI is Body mass index is 24.13 kg/m .  Weight management is a personal decision.  If you are interested in exploring weight loss strategies, the following discussion covers the approaches that may be successful. Body mass index (BMI) is one way to tell whether you are at a healthy weight, overweight, or obese. It measures your weight in relation to your height.  A BMI of 18.5 to 24.9 is in the healthy range. A person with a BMI of 25 to 29.9 is considered overweight, and someone with a BMI of 30 or greater is considered obese. More than two-thirds of American adults are considered overweight or obese.  Being overweight or obese increases the risk for further weight gain. Excess weight may lead to heart disease and diabetes.  Creating and following plans for healthy eating and physical activity may help you improve your health.  Weight control is part of healthy lifestyle and includes exercise, emotional health, and healthy eating habits. Careful eating habits lifelong are the mainstay of weight control. Though there are significant health benefits from weight loss, long-term weight loss with diet alone may be very difficult to achieve- studies show long-term success with dietary management in less than 10% of people. Attaining a healthy weight may be especially difficult to achieve in those with severe obesity. In some cases, medications, devices and surgical management might be considered.  What can you do?  If you are overweight or obese and are interested in methods for weight loss, you should discuss this with your provider.     Consider reducing daily calorie intake by 500 calories.     Keep a food journal.     Avoiding skipping meals, consider cutting portions instead.    Diet combined with exercise helps maintain muscle while optimizing fat loss. Strength training is particularly important for building and maintaining muscle mass. Exercise helps reduce stress, increase energy, and improves fitness.  Increasing exercise without diet control, however, may not burn enough calories to loose weight.       Start walking three days a week 10-20 minutes at a time    Work towards walking thirty minutes five days a week     Eventually, increase the speed of your walking for 1-2 minutes at time    In addition, we recommend that you review healthy lifestyles and methods for weight loss available through the National Institutes of Health patient information sites:  http://win.niddk.nih.gov/publications/index.htm    And look into health and wellness programs that may be available through your health insurance provider, employer, local community center, or fatoumata club.

## 2021-02-15 NOTE — PROGRESS NOTES
Dakota Wilkinson is a 50 year old who is being evaluated via a billable video visit.      How would you like to obtain your AVS? MyChart  If the video visit is dropped, the invitation should be resent by: Text to cell phone: [phone]  Will anyone else be joining your video visit? No      Video Start Time:  Failed start, attempted at 1:08 PM  Video-Visit Details    Type of service:  Video Visit    Video End Time:Lisa would not respond, Called patient, directed him to [email or web address], no response by patient.  Called patient to do phone visit, went to , instructed patient to [phone] to initiate phone visit or call [phone] to reschedule    Originating Location (pt. Location): Home    Distant Location (provider location):  [facility] SLE[facility]

## 2021-02-16 ENCOUNTER — VIRTUAL VISIT (OUTPATIENT)
Dept: SLEEP MEDICINE | Facility: CLINIC | Age: 51
End: 2021-02-16
Payer: COMMERCIAL

## 2021-02-16 DIAGNOSIS — G47.61 PERIODIC LIMB MOVEMENT DISORDER (PLMD): ICD-10-CM

## 2021-02-16 DIAGNOSIS — F51.04 CHRONIC INSOMNIA: Primary | ICD-10-CM

## 2021-04-12 VITALS — HEIGHT: 65 IN | WEIGHT: 145 LBS | BODY MASS INDEX: 24.16 KG/M2

## 2021-04-12 ASSESSMENT — MIFFLIN-ST. JEOR: SCORE: 1449.6

## 2021-04-12 NOTE — PROGRESS NOTES
Dakota is a 50 year old who is being evaluated via a billable video visit.      How would you like to obtain your AVS? MyChart  If the video visit is dropped, the invitation should be resent by: Text to cell phone: [phone]  Will anyone else be joining your video visit? No  Rosetta Trejo CMA  Video Start Time: 8:29 AM  Video-Visit Details    Type of service:  Video Visit    Video End Time:8:46 AM    Originating Location (pt. Location): Home    Distant Location (provider location):  [facility] SLEEP [facility]     Platform used for Video Visit: [facility]     SLEEP MEDICINE VIRTUAL VIDEO FOLLOW-UP VISIT  Sleep Psychology    Patient Name: Dakota Wilkinson  MRN:  [id number]  Date of Service: Apr 13, 2021       Subjective Report     Dakota Wilkinson  returns for a telehealth video visit to discuss progress in implementing behavioral strategies for the management of insomnia.  Patient consent for initiation of video visit was obtained and documented prior to initiation of visit.     Dakota reports his pramipexole continues to help with sleep quality. Going to bed about11 PM..Up for the day by 630-7 AM.  Sleeping mostly through the night.  He is reporting good overall sleep quality.     He is able to stay away during the day and napping is now only rare.     .     Sleep Data:     Source of Sleep Estimates:  verbal self-report    Average Sleep Latency: 20 min.  Times Awakened: 1  Average Wake Time After Sleep Onset: 10 min.  Average Total Sleep Time:  7.5 hrs  Sleep Efficiency: 90%    EPWORTH SLEEPINESS SCALE    Sitting and reading 2   Watching TV 2   Sitting, inactive in a public place (theatre or mtg.) 0    As a passenger in a car 1   Lying down to rest in the afternoon when circumstance permit 3   Sitting and talking to someone 0   Sitting quietly after lunch without alcohol 1   In a car, while stopped for a few minutes in traffic 0   TOTAL SCORE (nl <11) 9     INSOMNIA SEVERITY INDEX     Difficulty falling asleep  "1   Difficult staying asleep 0   Problems waking up to early 0   How SATISFIED/DISSATISFIED are you with your CURRENT sleep pattern? 0   How NOTICEABLE to others do you think your sleep pattern is in terms of your quality of life? 1   How WORRIED/DISTRESSED are you about your current sleep pattern? 1   To what extent do you consider your sleep problem to INTERFERE with your daily fuctioning(e.g. daytime fatigue, mood, ability to function at work/daily chores, concentration, mood,etc.) CURRENTLY? 1   INSOMNIA SEVERITY INDEX TOTAL SCORE 4    Absence of insomnia (0-7); sub-threshold insomnia (8-14); moderate insomnia (15-21); and severe insomnia (22-28)       Interventions     Strategies and recommendations including maintenance of stimulus control and RLS were discussed today.       Vital Signs     Ht 1.651 m (5' 5\")   Wt 65.8 kg (145 lb)   BMI 24.13 kg/m       Mental Status     Orientation:  X3  Mood:  normal  Affect:  Congruent with mood  Speech/Language:  Normal  Thought Process: Intact  Associations:  Normal  Thought Content: Normal  Patient does not report any suicidal ideation, intention or plan.    Diagnostic Impressions and Plan        Chronic insomnia  Periodic limb movement disorder (PLMD)    Plan:  continue current sleep schedule and plan.  Sleep parameters appear normal with no EDS reported.  Continue current sleep schedule.    Follow-up: as needed if symptoms recur and continue to follow with Dr. Butch Osman for management of RLS       Anatoliy Heredia PsyD, LP, [facility]  Diplomate, Behavioral Sleep Medicine  [facility]      Note: This dictation was created using voice recognition software. This document may contain an error not identified before finalizing the document. If the error changes the accuracy of the document, I would appreciate it being brought to my attention.                             "

## 2021-04-13 ENCOUNTER — VIRTUAL VISIT (OUTPATIENT)
Dept: SLEEP MEDICINE | Facility: CLINIC | Age: 51
End: 2021-04-13
Payer: COMMERCIAL

## 2021-04-13 DIAGNOSIS — G47.61 PERIODIC LIMB MOVEMENT DISORDER (PLMD): ICD-10-CM

## 2021-04-13 DIAGNOSIS — F51.04 CHRONIC INSOMNIA: Primary | ICD-10-CM

## 2021-04-13 PROCEDURE — 90832 PSYTX W PT 30 MINUTES: CPT | Mod: 95 | Performed by: PSYCHOLOGIST

## 2021-05-13 ENCOUNTER — E-VISIT (OUTPATIENT)
Dept: FAMILY MEDICINE | Facility: CLINIC | Age: 51
End: 2021-05-13
Payer: COMMERCIAL

## 2021-05-13 DIAGNOSIS — Z86.16 HISTORY OF COVID-19: Primary | ICD-10-CM

## 2021-05-13 PROCEDURE — 99421 OL DIG E/M SVC 5-10 MIN: CPT | Performed by: PREVENTIVE MEDICINE

## 2021-10-24 ENCOUNTER — HEALTH MAINTENANCE LETTER (OUTPATIENT)
Age: 51
End: 2021-10-24

## 2021-11-16 ENCOUNTER — TELEPHONE (OUTPATIENT)
Dept: FAMILY MEDICINE | Facility: CLINIC | Age: 51
End: 2021-11-16
Payer: COMMERCIAL

## 2021-11-16 NOTE — TELEPHONE ENCOUNTER
.What type of form? disability  What day did you drop off your forms? 11/16/2021  Is there a due date?  (7-10 business day to compete forms)   How would you like to receive these forms? Please mail to [address] [place], [place]  Which clinic was the form dropped off at? [facility]    What is the best number to contact you? [phone]  What time works best to contact you with in 4 hrs? any  Is it okay to leave a message? Yes    Haylie Ruffin

## 2022-01-27 ENCOUNTER — APPOINTMENT (OUTPATIENT)
Dept: RADIOLOGY | Facility: CLINIC | Age: 52
DRG: 493 | End: 2022-01-27
Attending: STUDENT IN AN ORGANIZED HEALTH CARE EDUCATION/TRAINING PROGRAM
Payer: COMMERCIAL

## 2022-01-27 ENCOUNTER — APPOINTMENT (OUTPATIENT)
Dept: CT IMAGING | Facility: CLINIC | Age: 52
DRG: 493 | End: 2022-01-27
Attending: EMERGENCY MEDICINE
Payer: COMMERCIAL

## 2022-01-27 ENCOUNTER — ANESTHESIA (OUTPATIENT)
Dept: SURGERY | Facility: CLINIC | Age: 52
DRG: 493 | End: 2022-01-27
Payer: COMMERCIAL

## 2022-01-27 ENCOUNTER — HOSPITAL ENCOUNTER (INPATIENT)
Facility: CLINIC | Age: 52
LOS: 1 days | Discharge: HOME | DRG: 493 | End: 2022-01-28
Attending: EMERGENCY MEDICINE | Admitting: INTERNAL MEDICINE
Payer: COMMERCIAL

## 2022-01-27 ENCOUNTER — ANESTHESIA EVENT (OUTPATIENT)
Dept: SURGERY | Facility: CLINIC | Age: 52
DRG: 493 | End: 2022-01-27
Payer: COMMERCIAL

## 2022-01-27 DIAGNOSIS — S82.401A TIBIA/FIBULA FRACTURE, RIGHT, CLOSED, INITIAL ENCOUNTER: ICD-10-CM

## 2022-01-27 DIAGNOSIS — S82.201A TIBIA/FIBULA FRACTURE, RIGHT, CLOSED, INITIAL ENCOUNTER: ICD-10-CM
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
## 2022-01-27 DIAGNOSIS — S82.241A CLOSED DISPLACED SPIRAL FRACTURE OF SHAFT OF RIGHT TIBIA, INITIAL ENCOUNTER: Primary | ICD-10-CM

## 2022-01-27 DIAGNOSIS — G80.1 SPASTIC DIPLEGIC CEREBRAL PALSY (H): ICD-10-CM

## 2022-01-27 PROBLEM — Z86.16 HISTORY OF COVID-19: Status: ACTIVE | Noted: 2022-01-27

## 2022-01-27 PROBLEM — G47.61 PERIODIC LIMB MOVEMENT DISORDER (PLMD): Status: ACTIVE | Noted: 2021-02-02

## 2022-01-27 PROBLEM — Z87.81 PERSONAL HISTORY OF TRAUMATIC FRACTURE: Status: ACTIVE | Noted: 2022-01-27

## 2022-01-27 PROBLEM — R27.0 ATAXIA: Status: ACTIVE | Noted: 2022-01-27

## 2022-01-27 LAB
ANION GAP SERPL CALCULATED.3IONS-SCNC: 11 MMOL/L (ref 5–18)
APTT PPP: 30 SECONDS (ref 22–38)
ATRIAL RATE - MUSE: 73 BPM
BUN SERPL-MCNC: 20 MG/DL (ref 8–22)
CALCIUM SERPL-MCNC: 8.6 MG/DL (ref 8.5–10.5)
CHLORIDE BLD-SCNC: 108 MMOL/L (ref 98–107)
CO2 SERPL-SCNC: 20 MMOL/L (ref 22–31)
CREAT SERPL-MCNC: 0.83 MG/DL (ref 0.7–1.3)
DIASTOLIC BLOOD PRESSURE - MUSE: 77 MMHG
ERYTHROCYTE [DISTWIDTH] IN BLOOD BY AUTOMATED COUNT: 13.2 % (ref 10–15)
FLUAV RNA SPEC QL NAA+PROBE: NEGATIVE
FLUBV RNA RESP QL NAA+PROBE: NEGATIVE
GFR SERPL CREATININE-BSD FRML MDRD: >90 ML/MIN/1.73M2
GLUCOSE BLD-MCNC: 98 MG/DL (ref 70–125)
HCT VFR BLD AUTO: 46.7 % (ref 40–53)
HGB BLD-MCNC: 15.7 G/DL (ref 13.3–17.7)
INR PPP: 1.02 (ref 0.85–1.15)
INTERPRETATION ECG - MUSE: NORMAL
MCH RBC QN AUTO: 28.7 PG (ref 26.5–33)
MCHC RBC AUTO-ENTMCNC: 33.6 G/DL (ref 31.5–36.5)
MCV RBC AUTO: 85 FL (ref 78–100)
P AXIS - MUSE: 47 DEGREES
PLATELET # BLD AUTO: 346 10E3/UL (ref 150–450)
POTASSIUM BLD-SCNC: 3.7 MMOL/L (ref 3.5–5)
PR INTERVAL - MUSE: 136 MS
QRS DURATION - MUSE: 82 MS
QT - MUSE: 376 MS
QTC - MUSE: 414 MS
R AXIS - MUSE: 16 DEGREES
RBC # BLD AUTO: 5.47 10E6/UL (ref 4.4–5.9)
SARS-COV-2 RNA RESP QL NAA+PROBE: NEGATIVE
SODIUM SERPL-SCNC: 139 MMOL/L (ref 136–145)
SYSTOLIC BLOOD PRESSURE - MUSE: 137 MMHG
T AXIS - MUSE: 6 DEGREES
VENTRICULAR RATE- MUSE: 73 BPM
WBC # BLD AUTO: 11.4 10E3/UL (ref 4–11)

## 2022-01-27 PROCEDURE — 96375 TX/PRO/DX INJ NEW DRUG ADDON: CPT

## 2022-01-27 PROCEDURE — 96376 TX/PRO/DX INJ SAME DRUG ADON: CPT

## 2022-01-27 PROCEDURE — C9803 HOPD COVID-19 SPEC COLLECT: HCPCS

## 2022-01-27 PROCEDURE — 250N000011 HC RX IP 250 OP 636: Performed by: STUDENT IN AN ORGANIZED HEALTH CARE EDUCATION/TRAINING PROGRAM

## 2022-01-27 PROCEDURE — 87636 SARSCOV2 & INF A&B AMP PRB: CPT | Performed by: EMERGENCY MEDICINE

## 2022-01-27 PROCEDURE — 999N000182 XR SURGERY CARM FLUORO GREATER THAN 5 MIN: Mod: TC

## 2022-01-27 PROCEDURE — 258N000003 HC RX IP 258 OP 636: Performed by: NURSE ANESTHETIST, CERTIFIED REGISTERED

## 2022-01-27 PROCEDURE — 250N000011 HC RX IP 250 OP 636: Performed by: NURSE ANESTHETIST, CERTIFIED REGISTERED

## 2022-01-27 PROCEDURE — 250N000011 HC RX IP 250 OP 636: Performed by: EMERGENCY MEDICINE

## 2022-01-27 PROCEDURE — 272N000001 HC OR GENERAL SUPPLY STERILE: Performed by: STUDENT IN AN ORGANIZED HEALTH CARE EDUCATION/TRAINING PROGRAM

## 2022-01-27 PROCEDURE — 250N000011 HC RX IP 250 OP 636: Performed by: ANESTHESIOLOGY

## 2022-01-27 PROCEDURE — 0QSG36Z REPOSITION RIGHT TIBIA WITH INTRAMEDULLARY INTERNAL FIXATION DEVICE, PERCUTANEOUS APPROACH: ICD-10-PCS | Performed by: STUDENT IN AN ORGANIZED HEALTH CARE EDUCATION/TRAINING PROGRAM

## 2022-01-27 PROCEDURE — 73700 CT LOWER EXTREMITY W/O DYE: CPT | Mod: RT

## 2022-01-27 PROCEDURE — 85730 THROMBOPLASTIN TIME PARTIAL: CPT | Performed by: EMERGENCY MEDICINE

## 2022-01-27 PROCEDURE — 99285 EMERGENCY DEPT VISIT HI MDM: CPT | Mod: 25

## 2022-01-27 PROCEDURE — 85610 PROTHROMBIN TIME: CPT | Performed by: EMERGENCY MEDICINE

## 2022-01-27 PROCEDURE — 93005 ELECTROCARDIOGRAM TRACING: CPT | Performed by: EMERGENCY MEDICINE

## 2022-01-27 PROCEDURE — 82947 ASSAY GLUCOSE BLOOD QUANT: CPT | Performed by: EMERGENCY MEDICINE

## 2022-01-27 PROCEDURE — 258N000003 HC RX IP 258 OP 636: Performed by: ANESTHESIOLOGY

## 2022-01-27 PROCEDURE — 99223 1ST HOSP IP/OBS HIGH 75: CPT | Performed by: INTERNAL MEDICINE

## 2022-01-27 PROCEDURE — 96374 THER/PROPH/DIAG INJ IV PUSH: CPT

## 2022-01-27 PROCEDURE — 710N000010 HC RECOVERY PHASE 1, LEVEL 2, PER MIN: Performed by: STUDENT IN AN ORGANIZED HEALTH CARE EDUCATION/TRAINING PROGRAM

## 2022-01-27 PROCEDURE — 36415 COLL VENOUS BLD VENIPUNCTURE: CPT | Performed by: EMERGENCY MEDICINE

## 2022-01-27 PROCEDURE — 250N000009 HC RX 250: Performed by: NURSE ANESTHETIST, CERTIFIED REGISTERED

## 2022-01-27 PROCEDURE — 120N000001 HC R&B MED SURG/OB

## 2022-01-27 PROCEDURE — 360N000084 HC SURGERY LEVEL 4 W/ FLUORO, PER MIN: Performed by: STUDENT IN AN ORGANIZED HEALTH CARE EDUCATION/TRAINING PROGRAM

## 2022-01-27 PROCEDURE — 370N000017 HC ANESTHESIA TECHNICAL FEE, PER MIN: Performed by: STUDENT IN AN ORGANIZED HEALTH CARE EDUCATION/TRAINING PROGRAM

## 2022-01-27 PROCEDURE — 85027 COMPLETE CBC AUTOMATED: CPT | Performed by: EMERGENCY MEDICINE

## 2022-01-27 PROCEDURE — 96361 HYDRATE IV INFUSION ADD-ON: CPT

## 2022-01-27 PROCEDURE — C1769 GUIDE WIRE: HCPCS | Performed by: STUDENT IN AN ORGANIZED HEALTH CARE EDUCATION/TRAINING PROGRAM

## 2022-01-27 PROCEDURE — 250N000013 HC RX MED GY IP 250 OP 250 PS 637: Performed by: PHYSICIAN ASSISTANT

## 2022-01-27 PROCEDURE — 999N000141 HC STATISTIC PRE-PROCEDURE NURSING ASSESSMENT: Performed by: STUDENT IN AN ORGANIZED HEALTH CARE EDUCATION/TRAINING PROGRAM

## 2022-01-27 PROCEDURE — C1713 ANCHOR/SCREW BN/BN,TIS/BN: HCPCS | Performed by: STUDENT IN AN ORGANIZED HEALTH CARE EDUCATION/TRAINING PROGRAM

## 2022-01-27 DEVICE — IMPLANTABLE DEVICE: Type: IMPLANTABLE DEVICE | Site: LEG | Status: FUNCTIONAL

## 2022-01-27 RX ORDER — DIAZEPAM 10 MG/2ML
2.5 INJECTION, SOLUTION INTRAMUSCULAR; INTRAVENOUS ONCE
Status: COMPLETED | OUTPATIENT
Start: 2022-01-27 | End: 2022-01-27

## 2022-01-27 RX ORDER — NALOXONE HYDROCHLORIDE 0.4 MG/ML
0.2 INJECTION, SOLUTION INTRAMUSCULAR; INTRAVENOUS; SUBCUTANEOUS
Status: DISCONTINUED | OUTPATIENT
Start: 2022-01-27 | End: 2022-01-28 | Stop reason: HOSPADM

## 2022-01-27 RX ORDER — LIDOCAINE 40 MG/G
CREAM TOPICAL
Status: DISCONTINUED | OUTPATIENT
Start: 2022-01-27 | End: 2022-01-28 | Stop reason: HOSPADM

## 2022-01-27 RX ORDER — ONDANSETRON 2 MG/ML
4 INJECTION INTRAMUSCULAR; INTRAVENOUS EVERY 30 MIN PRN
Status: DISCONTINUED | OUTPATIENT
Start: 2022-01-27 | End: 2022-01-27 | Stop reason: HOSPADM

## 2022-01-27 RX ORDER — METHOCARBAMOL 500 MG/1
500 TABLET, FILM COATED ORAL ONCE
Status: COMPLETED | OUTPATIENT
Start: 2022-01-27 | End: 2022-01-27

## 2022-01-27 RX ORDER — PRAMIPEXOLE DIHYDROCHLORIDE 0.12 MG/1
0.12 TABLET ORAL AT BEDTIME
Status: DISCONTINUED | OUTPATIENT
Start: 2022-01-28 | End: 2022-01-27

## 2022-01-27 RX ORDER — LIDOCAINE HYDROCHLORIDE 10 MG/ML
INJECTION, SOLUTION INFILTRATION; PERINEURAL PRN
Status: DISCONTINUED | OUTPATIENT
Start: 2022-01-27 | End: 2022-01-27

## 2022-01-27 RX ORDER — NALOXONE HYDROCHLORIDE 0.4 MG/ML
0.4 INJECTION, SOLUTION INTRAMUSCULAR; INTRAVENOUS; SUBCUTANEOUS
Status: DISCONTINUED | OUTPATIENT
Start: 2022-01-27 | End: 2022-01-28 | Stop reason: HOSPADM

## 2022-01-27 RX ORDER — CLINDAMYCIN PHOSPHATE 900 MG/50ML
900 INJECTION, SOLUTION INTRAVENOUS SEE ADMIN INSTRUCTIONS
Status: DISCONTINUED | OUTPATIENT
Start: 2022-01-27 | End: 2022-01-27 | Stop reason: HOSPADM

## 2022-01-27 RX ORDER — ONDANSETRON 2 MG/ML
4 INJECTION INTRAMUSCULAR; INTRAVENOUS ONCE
Status: COMPLETED | OUTPATIENT
Start: 2022-01-27 | End: 2022-01-27

## 2022-01-27 RX ORDER — SODIUM CHLORIDE, SODIUM LACTATE, POTASSIUM CHLORIDE, CALCIUM CHLORIDE 600; 310; 30; 20 MG/100ML; MG/100ML; MG/100ML; MG/100ML
INJECTION, SOLUTION INTRAVENOUS CONTINUOUS
Status: DISCONTINUED | OUTPATIENT
Start: 2022-01-27 | End: 2022-01-28 | Stop reason: HOSPADM

## 2022-01-27 RX ORDER — GLYCOPYRROLATE 1 MG/1
1 TABLET ORAL 3 TIMES DAILY
Status: DISCONTINUED | OUTPATIENT
Start: 2022-01-28 | End: 2022-01-27

## 2022-01-27 RX ORDER — PROCHLORPERAZINE MALEATE 10 MG
10 TABLET ORAL EVERY 6 HOURS PRN
Status: DISCONTINUED | OUTPATIENT
Start: 2022-01-27 | End: 2022-01-28 | Stop reason: HOSPADM

## 2022-01-27 RX ORDER — ACETAMINOPHEN 500 MG
500-1000 TABLET ORAL EVERY 8 HOURS PRN
Status: DISCONTINUED | OUTPATIENT
Start: 2022-01-27 | End: 2022-01-28 | Stop reason: HOSPADM

## 2022-01-27 RX ORDER — BUPIVACAINE HYDROCHLORIDE 7.5 MG/ML
INJECTION, SOLUTION INTRASPINAL
Status: COMPLETED | OUTPATIENT
Start: 2022-01-27 | End: 2022-01-27

## 2022-01-27 RX ORDER — ONDANSETRON 2 MG/ML
INJECTION INTRAMUSCULAR; INTRAVENOUS PRN
Status: DISCONTINUED | OUTPATIENT
Start: 2022-01-27 | End: 2022-01-27

## 2022-01-27 RX ORDER — ONDANSETRON 2 MG/ML
4 INJECTION INTRAMUSCULAR; INTRAVENOUS EVERY 6 HOURS PRN
Status: DISCONTINUED | OUTPATIENT
Start: 2022-01-27 | End: 2022-01-28 | Stop reason: HOSPADM

## 2022-01-27 RX ORDER — AMOXICILLIN 250 MG
1 CAPSULE ORAL 2 TIMES DAILY
Status: DISCONTINUED | OUTPATIENT
Start: 2022-01-27 | End: 2022-01-28 | Stop reason: HOSPADM

## 2022-01-27 RX ORDER — DEXAMETHASONE SODIUM PHOSPHATE 10 MG/ML
INJECTION, SOLUTION INTRAMUSCULAR; INTRAVENOUS PRN
Status: DISCONTINUED | OUTPATIENT
Start: 2022-01-27 | End: 2022-01-27

## 2022-01-27 RX ORDER — HYDROMORPHONE HCL IN WATER/PF 6 MG/30 ML
0.2 PATIENT CONTROLLED ANALGESIA SYRINGE INTRAVENOUS
Status: DISCONTINUED | OUTPATIENT
Start: 2022-01-27 | End: 2022-01-28 | Stop reason: HOSPADM

## 2022-01-27 RX ORDER — MAGNESIUM SULFATE 4 G/50ML
4 INJECTION INTRAVENOUS ONCE
Status: DISCONTINUED | OUTPATIENT
Start: 2022-01-27 | End: 2022-01-27

## 2022-01-27 RX ORDER — POLYETHYLENE GLYCOL 3350 17 G/17G
17 POWDER, FOR SOLUTION ORAL DAILY
Status: DISCONTINUED | OUTPATIENT
Start: 2022-01-28 | End: 2022-01-28 | Stop reason: HOSPADM

## 2022-01-27 RX ORDER — ONDANSETRON 4 MG/1
4 TABLET, ORALLY DISINTEGRATING ORAL EVERY 6 HOURS PRN
Status: DISCONTINUED | OUTPATIENT
Start: 2022-01-27 | End: 2022-01-28 | Stop reason: HOSPADM

## 2022-01-27 RX ORDER — LIDOCAINE 40 MG/G
CREAM TOPICAL
Status: DISCONTINUED | OUTPATIENT
Start: 2022-01-27 | End: 2022-01-27

## 2022-01-27 RX ORDER — FENTANYL CITRATE 50 UG/ML
50 INJECTION, SOLUTION INTRAMUSCULAR; INTRAVENOUS
Status: DISCONTINUED | OUTPATIENT
Start: 2022-01-27 | End: 2022-01-27

## 2022-01-27 RX ORDER — BACLOFEN 10 MG/1
10 TABLET ORAL 3 TIMES DAILY
Status: DISCONTINUED | OUTPATIENT
Start: 2022-01-28 | End: 2022-01-28 | Stop reason: HOSPADM

## 2022-01-27 RX ORDER — SODIUM CHLORIDE, SODIUM LACTATE, POTASSIUM CHLORIDE, CALCIUM CHLORIDE 600; 310; 30; 20 MG/100ML; MG/100ML; MG/100ML; MG/100ML
INJECTION, SOLUTION INTRAVENOUS CONTINUOUS
Status: DISCONTINUED | OUTPATIENT
Start: 2022-01-27 | End: 2022-01-27 | Stop reason: HOSPADM

## 2022-01-27 RX ORDER — HYDROMORPHONE HYDROCHLORIDE 2 MG/1
2-4 TABLET ORAL EVERY 4 HOURS PRN
Status: DISCONTINUED | OUTPATIENT
Start: 2022-01-27 | End: 2022-01-28 | Stop reason: HOSPADM

## 2022-01-27 RX ORDER — BISACODYL 10 MG
10 SUPPOSITORY, RECTAL RECTAL DAILY PRN
Status: DISCONTINUED | OUTPATIENT
Start: 2022-01-27 | End: 2022-01-28 | Stop reason: HOSPADM

## 2022-01-27 RX ORDER — TRANEXAMIC ACID 650 MG/1
1950 TABLET ORAL ONCE
Status: DISCONTINUED | OUTPATIENT
Start: 2022-01-27 | End: 2022-01-27 | Stop reason: HOSPADM

## 2022-01-27 RX ORDER — FENTANYL CITRATE 50 UG/ML
25 INJECTION, SOLUTION INTRAMUSCULAR; INTRAVENOUS EVERY 5 MIN PRN
Status: DISCONTINUED | OUTPATIENT
Start: 2022-01-27 | End: 2022-01-27 | Stop reason: HOSPADM

## 2022-01-27 RX ORDER — HYDROMORPHONE HYDROCHLORIDE 1 MG/ML
0.5 INJECTION, SOLUTION INTRAMUSCULAR; INTRAVENOUS; SUBCUTANEOUS EVERY 30 MIN PRN
Status: DISCONTINUED | OUTPATIENT
Start: 2022-01-27 | End: 2022-01-27

## 2022-01-27 RX ORDER — PROPOFOL 10 MG/ML
INJECTION, EMULSION INTRAVENOUS CONTINUOUS PRN
Status: DISCONTINUED | OUTPATIENT
Start: 2022-01-27 | End: 2022-01-27

## 2022-01-27 RX ORDER — CLINDAMYCIN PHOSPHATE 900 MG/50ML
900 INJECTION, SOLUTION INTRAVENOUS
Status: COMPLETED | OUTPATIENT
Start: 2022-01-27 | End: 2022-01-27

## 2022-01-27 RX ORDER — CLINDAMYCIN PHOSPHATE 600 MG/50ML
600 INJECTION, SOLUTION INTRAVENOUS EVERY 8 HOURS
Status: COMPLETED | OUTPATIENT
Start: 2022-01-28 | End: 2022-01-28

## 2022-01-27 RX ORDER — ONDANSETRON 4 MG/1
4 TABLET, ORALLY DISINTEGRATING ORAL EVERY 30 MIN PRN
Status: DISCONTINUED | OUTPATIENT
Start: 2022-01-27 | End: 2022-01-27 | Stop reason: HOSPADM

## 2022-01-27 RX ADMIN — PROPOFOL 50 MCG/KG/MIN: 10 INJECTION, EMULSION INTRAVENOUS at 18:32

## 2022-01-27 RX ADMIN — CLINDAMYCIN PHOSPHATE 900 MG: 900 INJECTION, SOLUTION INTRAVENOUS at 18:36

## 2022-01-27 RX ADMIN — HYDROMORPHONE HYDROCHLORIDE 0.5 MG: 1 INJECTION, SOLUTION INTRAMUSCULAR; INTRAVENOUS; SUBCUTANEOUS at 13:35

## 2022-01-27 RX ADMIN — PHENYLEPHRINE HYDROCHLORIDE 0.3 MCG/KG/MIN: 10 INJECTION INTRAVENOUS at 18:33

## 2022-01-27 RX ADMIN — BUPIVACAINE HYDROCHLORIDE IN DEXTROSE 1.6 ML: 7.5 INJECTION, SOLUTION SUBARACHNOID at 18:27

## 2022-01-27 RX ADMIN — ONDANSETRON 4 MG: 2 INJECTION INTRAMUSCULAR; INTRAVENOUS at 13:33

## 2022-01-27 RX ADMIN — PHENYLEPHRINE HYDROCHLORIDE 100 MCG: 10 INJECTION INTRAVENOUS at 18:35

## 2022-01-27 RX ADMIN — DIAZEPAM 2.5 MG: 5 INJECTION, SOLUTION INTRAMUSCULAR; INTRAVENOUS at 15:46

## 2022-01-27 RX ADMIN — SODIUM CHLORIDE, POTASSIUM CHLORIDE, SODIUM LACTATE AND CALCIUM CHLORIDE: 600; 310; 30; 20 INJECTION, SOLUTION INTRAVENOUS at 19:41

## 2022-01-27 RX ADMIN — DEXAMETHASONE SODIUM PHOSPHATE 10 MG: 10 INJECTION, SOLUTION INTRAMUSCULAR; INTRAVENOUS at 18:49

## 2022-01-27 RX ADMIN — SODIUM CHLORIDE, POTASSIUM CHLORIDE, SODIUM LACTATE AND CALCIUM CHLORIDE: 600; 310; 30; 20 INJECTION, SOLUTION INTRAVENOUS at 16:49

## 2022-01-27 RX ADMIN — METHOCARBAMOL 500 MG: 500 TABLET ORAL at 15:13

## 2022-01-27 RX ADMIN — MIDAZOLAM 2 MG: 1 INJECTION INTRAMUSCULAR; INTRAVENOUS at 18:14

## 2022-01-27 RX ADMIN — ONDANSETRON 4 MG: 2 INJECTION INTRAMUSCULAR; INTRAVENOUS at 18:53

## 2022-01-27 RX ADMIN — LIDOCAINE HYDROCHLORIDE 5 ML: 10 INJECTION, SOLUTION INFILTRATION; PERINEURAL at 18:32

## 2022-01-27 RX ADMIN — HYDROMORPHONE HYDROCHLORIDE 0.5 MG: 1 INJECTION, SOLUTION INTRAMUSCULAR; INTRAVENOUS; SUBCUTANEOUS at 16:47

## 2022-01-27 ASSESSMENT — ACTIVITIES OF DAILY LIVING (ADL)
ADLS_ACUITY_SCORE: 10
ADLS_ACUITY_SCORE: 8
ADLS_ACUITY_SCORE: 10
ADLS_ACUITY_SCORE: 12
ADLS_ACUITY_SCORE: 12

## 2022-01-27 ASSESSMENT — MIFFLIN-ST. JEOR: SCORE: 1467.28

## 2022-01-27 NOTE — ED PROVIDER NOTES
EMERGENCY DEPARTMENT ENCOUNTER      NAME: Dakota Wilkinson  AGE: 51 year old male  YOB: 1970  MRN: [id number]  EVALUATION DATE & TIME: No admission date for patient encounter.    PCP: Sunita Dumont    ED PROVIDER: Raul Gamboa M.D.      No chief complaint on file.    Acute fall with leg fracture.    FINAL IMPRESSION:  1.  Acute fall.  2.  Acute right leg tibia and fibula fracture.  3.  Chronic cerebral palsy with spastic diplegia.      ED COURSE & MEDICAL DECISION MAKIN:05 PM I met with the patient to gather history and to perform my initial exam. We discussed plans for the ED course, including diagnostic testing and treatment.  2:35 PM I paged ortho.  Basic metabolic profile unremarkable.  CBC unremarkable white count 11.4.  INR PTT unremarkable.  Flu and Covid testing pending.  Ankle CT showing a mild displaced fracture of the distal tibia.  There is no ankle joint involvement with this new fracture.  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  We have paged orthopedics.  3:10 PM.  I spoke with the PA from orthopedics.  I also spoke with the hospitalist.  The hospitalist will see the patient preparatory to patient going to the OR per orthopedics.  A preop EKG was ordered.    PPE worn: cloth mask.  I took a patient expected from the orthopedics physician's assistant at [facility] with [facility].  This patient had a fall and twisted his right ankle and by report suffered a proximal right lower leg fibula fracture and a distal tibia fracture.  Patient being sent in for preop evaluation and CAT scan of his ankle.  The PA discussed his care with Dr. Mcneal from [facility] who tentatively plan surgery this afternoon.  Patient kept n.p.o.      Pertinent Labs & Imaging studies reviewed. (See chart for details)  51 year old male presents to the Emergency Department for evaluation of fall and right leg fracture.    At the conclusion of the  encounter I discussed the results of all of the tests and the disposition. The questions were answered. The patient or family acknowledged understanding and was agreeable with the care plan.       MEDICATIONS GIVEN IN THE EMERGENCY:  Medications - No data to display    NEW PRESCRIPTIONS STARTED AT TODAY'S ER VISIT  New Prescriptions    No medications on file          =================================================================    HPI    Patient information was obtained from: patient    Use of : N/A       Dakota Wilkinson is a 51 year old male with a pertinent history of hypertension, closed displaced comminuted fracture of shaft of left tibia (2017) s/p orthopedic surgery (2017), PLMD, spastic diplegic cerebral palsy and chronic insomnia who presents to this ED via walk-in for evaluation of tibia injury.    Patient reports today he fell and twisted his right ankle resulting in a proximal tibia fracture and distal tibia fracture on the right. Patient relays his PA spoke to Dr. Mcneal from [facility] who is contemplating surgery this afternoon. Patient presents to the ED today to have pre-op testing done and a CT scan on his ankle to ensure the fracture doesn't continue down.     He does not identify any waxing or waning symptoms otherwise, exacerbating or alleviating features,associated symptoms except as mentioned. HE denies any pain related complaints.    REVIEW OF SYSTEMS   Review of Systems   Musculoskeletal:        Positive for tibia pain due to fractures.    All other systems reviewed and are negative.       PAST MEDICAL HISTORY:  Past Medical History:   Diagnosis Date     Closed displaced comminuted fracture of shaft of left tibia 7/14/2017     Hypertension        PAST SURGICAL HISTORY:  Past Surgical History:   Procedure Laterality Date     ORTHOPEDIC SURGERY  07/14/2017    IM ZARA TIBIA 7/14/2017 Leg Lower/LeftProcedure: IM ZARA LEFT TIBIA; Laterality: Left; Surgeon: Zaki Bonilla MD;  Service: Orthopedics       ORTHOPEDIC SURGERY Bilateral 1993    multiple arthroscopies early 1990s     ORTHOPEDIC SURGERY Bilateral 1993    nerve 'tranposition'? both legs           CURRENT MEDICATIONS:    baclofen (LIORESAL) 10 MG tablet  glycopyrrolate (ROBINUL) 1 MG tablet  pramipexole (MIRAPEX) 0.125 MG tablet        ALLERGIES:  Allergies   Allergen Reactions     Amoxicillin      Codeine      Morphine Unknown     Penicillins      Percocet [Oxycodone-Acetaminophen]    Patient notes a rash with Percocet and morphine.  He notes he has tolerated Dilaudid in the past without difficulty.    FAMILY HISTORY:  Family History   Problem Relation Age of Onset     Diabetes Maternal Grandmother      Cerebrovascular Disease Maternal Grandmother      Dementia Maternal Grandmother      Cancer Maternal Grandfather      Diabetes Paternal Grandmother      Cerebrovascular Disease Paternal Grandmother      Colon Cancer Paternal Grandmother      Prostate Cancer Paternal Grandmother      Other Cancer Paternal Grandmother        SOCIAL HISTORY:   Social History     Socioeconomic History     Marital status:      Spouse name: Not on file     Number of children: Not on file     Years of education: Not on file     Highest education level: Not on file   Occupational History     Occupation: Retired     Comment: [employer]/ like [employer]   Tobacco Use     Smoking status: Former Smoker     Packs/day: 0.00     Years: 0.00     Pack years: 0.00     Smokeless tobacco: Never Used   Substance and Sexual Activity     Alcohol use: Yes     Comment: socially     Drug use: Never     Sexual activity: Not Currently   Other Topics Concern     Parent/sibling w/ CABG, MI or angioplasty before 65F 55M? No   Social History Narrative     Not on file     Social Determinants of Health     Financial Resource Strain: Not on file   Food Insecurity: Not on file   Transportation Needs: Not on file   Physical Activity: Not on file   Stress: Not on file  "  Social Connections: Not on file   Intimate Partner Violence: Not on file   Housing Stability: Not on file   Former smoker.  No current drugs or tobacco.  Patient notes occasional alcohol.    VITALS:  BP (!) 147/103   Pulse 105   Temp 98.3  F (36.8  C) (Oral)   Resp 16   Ht 1.651 m (5' 5\")   Wt 68 kg (150 lb)   SpO2 100%   BMI 24.96 kg/m      PHYSICAL EXAM    Vital Signs:  BP (!) 147/103   Pulse 105   Temp 98.3  F (36.8  C) (Oral)   Resp 16   Ht 1.651 m (5' 5\")   Wt 68 kg (150 lb)   SpO2 100%   BMI 24.96 kg/m    General:  On entering the room  is in no apparent distress.    Neck:  Neck supple with full range of motion and nontender.    Back:  Back and spine are nontender.  No costovertebral angle tenderness.    HEENT:  Oropharynx clear with moist mucous membranes.  HEENT unremarkable.    Pulmonary:  Chest clear to auscultation without rhonchi rales or wheezing.    Cardiovascular:  Cardiac regular rate and rhythm without murmurs rubs or gallops.    Abdomen:  Abdomen soft nontender.  There is no rebound or guarding.    Muskuloskeletal:  he moves all 4 without any difficulty and has normal neurovascular exams.  Extremities without clubbing, cyanosis, or edema.  Legs and calves are nontender.  Splint on the right lower leg.  Patient able to move his toes and wiggle them minimally given his underlying cerebral palsy.  Sensation intact in the toes.  Good pulse capillary refill.  No other complaints or injuries.  Neuro:  he is alert and oriented ×3 and moves all extremities symmetrically.    Psych:  Normal affect.    Skin:  Unremarkable and warm and dry.       LAB:  All pertinent labs reviewed and interpreted.  Labs Ordered and Resulted from Time of ED Arrival to Time of ED Departure   BASIC METABOLIC PANEL - Abnormal       Result Value    Sodium 139      Potassium 3.7      Chloride 108 (*)     Carbon Dioxide (CO2) 20 (*)     Anion Gap 11      Urea Nitrogen 20      Creatinine 0.83      Calcium 8.6      " Glucose 98      GFR Estimate >90     CBC WITH PLATELETS - Abnormal    WBC Count 11.4 (*)     RBC Count 5.47      Hemoglobin 15.7      Hematocrit 46.7      MCV 85      MCH 28.7      MCHC 33.6      RDW 13.2      Platelet Count 346     INR - Normal    INR 1.02     PARTIAL THROMBOPLASTIN TIME - Normal    aPTT 30     INFLUENZA A/B & SARS-COV2 PCR MULTIPLEX       RADIOLOGY:  Reviewed all pertinent imaging. Please see official radiology report.  CT Ankle Right w/o Contrast   Final Result   IMPRESSION:   1.  Acute mildly comminuted displaced fracture of the distal tibial shaft.                    EKG:          PROCEDURES:         I, Lauren Evangelista, am serving as a scribe to document services personally performed by Dr. Gamboa based on my observation and the provider's statements to me. I, Raul Gamboa MD attest that Lauren Evangelista is acting in a scribe capacity, has observed my performance of the services and has documented them in accordance with my direction.    Raul Gamboa M.D.  Emergency Medicine  [facility] EMERGENCY ROOM  [address] [address] [phone]  Dept: [phone]       Raul Gamboa MD  01/27/22 2982       Raul Gamboa MD  01/27/22 0106

## 2022-01-27 NOTE — ED PROVIDER NOTES
0784.  Phone call from PA at [facility] orth[facility].  Patient has cerebral palsy and apparently fell last night.  Reportedly has a distal tibia spiral fracture which is displaced also a proximal fibular fracture which is spiral and displaced.  Patient being sent in for preop evaluation as well as CAT scan of the ankle and lower leg to make sure there is not intra-articular distention.  Request for orthopedics for this evaluation.  The PA spoke with Dr. Fonseca who believes patient may go to the operating room this afternoon.     Raul Gamboa MD  01/27/22 0519

## 2022-01-27 NOTE — CONSULTS
ORTHOPEDIC CONSULTATION    CHIEF COMPLAINT: Left leg pain      HISTORY OF PRESENT ILLNESS:  The patient is seen in orthopedic consultation at the request of Raul Lee MD.  The patient is a 51 year old male with c/o left leg pain pain.  The patient has a history of spastic cerebral palsy and otherwise no significant medical history.  He has a history of a left tibial nail done at [facility] in 2017.  He also has had multiple previous orthopedic surgeries related to his spasticity.  He fell at home yesterday evening.  He felt a snap and knew he had broken his right tibia.  He could not get a ride until this morning and presented to [facility] in [place].  He was diagnosed with a right tibial shaft fracture and placed into a long-leg splint.  He was sent to the emergency department for further evaluation and management.  The patient reports that he is comfortable in the splint.  He does have spasticity in his right lower extremity that is overcoming the splint.  He has baseline numbness in his right foot that he said is no worse than normal.  He denies any further injuries.  His pain is well controlled.      PAST MEDICAL HISTORY:   Past Medical History:   Diagnosis Date     Closed displaced comminuted fracture of shaft of left tibia 7/14/2017     Hypertension        ALLERGIES:      Allergies   Allergen Reactions     Amoxicillin      Codeine      Morphine Unknown     Penicillins      Percocet [Oxycodone-Acetaminophen]         MEDICATIONS ON ADMISSION:  Medications were reviewed.  They do include:   (Not in a hospital admission)      SOCIAL HISTORY:   Social History     Tobacco Use     Smoking status: Former Smoker     Packs/day: 0.00     Years: 0.00     Pack years: 0.00     Smokeless tobacco: Never Used   Substance Use Topics     Alcohol use: Yes     Comment: socially     Drug use: Never        FAMILY HISTORY:  Family History   Problem Relation Age of Onset     Diabetes Maternal Grandmother       Cerebrovascular Disease Maternal Grandmother      Dementia Maternal Grandmother      Cancer Maternal Grandfather      Diabetes Paternal Grandmother      Cerebrovascular Disease Paternal Grandmother      Colon Cancer Paternal Grandmother      Prostate Cancer Paternal Grandmother      Other Cancer Paternal Grandmother        REVIEW OF SYSTEMS:   See Admission History and Physical     PHYSICAL EXAMINATION:    Temp:  [98.3  F (36.8  C)] 98.3  F (36.8  C)  Pulse:  [105] 105  Resp:  [16] 16  BP: (147)/(103) 147/103  SpO2:  [100 %] 100 %    General: On examination, the patient is A&Ox3  HEENT: Normal  Neuro: Patient is alert and interactive, he has spasticity of the bilateral lower extremities.  Psych: Normal affect, nonpressured speech.  Respiratory: Breathing unlabored on room air  Cardiovascular: Extremities warm and well-perfused.  Right lower extremity: Focused examination reveals a long-leg splint in place.  The patient has spasticity and has broken the splint proximally.  Visualized skin is intact.  He is not able to wiggle his toes due to his cerebral palsy which he reports is at his baseline.  Sensation is intact light touch at the DP/SP/tibial nerve distributions although decreased at his baseline.  He has a palpable DP pulse, and his foot is warm and well-perfused with brisk capillary refill.  He has no pain with passive stretch of his toes.    RADIOGRAPHIC EVALUATION:   X-rays obtained at [facility] reveal a spiral distal tibial shaft fracture that is displaced.  There is a proximal fibular shaft fracture.  There is a fragment of comminution proximally.  The fracture does not appear to extend distal into the ankle joint.  There is a screw present likely from previous ACL reconstruction in the proximal tibia.    CT of the tibia extending into the ankle reveals no extension of the fracture into the tibiotalar joint.  There is a chronic ununited fracture of the distal fibula.  There is a large talar OCD  lesion that again appears chronic.      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     PLAN:  I discussed with the patient his options today.  We discussed that with the displacement of the fracture and his ambulatory status, we should proceed with intramedullary nail fixation.  We discussed the risk, benefits, and alternatives to surgery with the risks including the risk of anesthesia, bleeding, infection, damage surrounding structures like blood vessels and nerves, nonunion, malunion, hardware failure, need for further surgery.  Ultimately after thorough discussion, the patient elected to proceed.  He will be admitted to the hospitalist service.  We will proceed with intramedullary nail fixation this evening.  He will be n.p.o. for surgery.  Hold anticoagulants for now.    Armen Fonseca MD  [facility]      Armen Fonseca MD

## 2022-01-27 NOTE — PHARMACY-ADMISSION MEDICATION HISTORY
Pharmacy Note - Admission Medication History    Pertinent Provider Information:  None     ______________________________________________________________________    Prior To Admission (PTA) med list completed and updated in EMR.       PTA Med List   Medication Sig Last Dose     baclofen (LIORESAL) 10 MG tablet Take 1 tablet (10 mg) by mouth 3 times daily Past Week at Unknown time       Information source(s): [facility] CareEverywhere[facility]  Method of interview communication: in-person    Summary of Changes to PTA Med List  New: None  Discontinued: glycopyrrolate, pramipexole  Changed: None    Patient was asked about OTC/herbal products specifically.  PTA med list reflects this.    In the past week, patient estimated taking medication this percent of the time:  50-90% due to illness.    Allergies were reviewed, assessed, and updated with the patient.      Patient does not use any multi-dose medications prior to admission.    The information provided in this note is only as accurate as the sources available at the time of the update(s).    Thank you for the opportunity to participate in the care of this patient.    Shannan Skelton [facility]  1/27/2022 3:47 PM  
98

## 2022-01-27 NOTE — H&P
[facility] MEDICINE ADMISSION HISTORY AND PHYSICAL     Assessment & Plan       Urgent preop evaluation prior to orthopedic surgery later this afternoon    Dakota Wilkinson is a 51 year old old male with history of spastic cerebral palsy, lives with mother and stepfather, retired from computer work, several degrees from college, status post left tib-fib fracture with surgical repair 2017 and multiple previous all orthopedic surgeries related to his spasticity   No presented with a right tib-fib fracture after falling at home today.  Sent to the ER by orthopedic clinic where x-rays confirmed a fracture.  The plan is for surgical repair later today  The patient's not had any active cardiac pulmonary or related issues he has had a history of 3 vaccines Moderna for Covid and reports having had Covid approximately a year ago.  No recent cardiac pulmonary or neurologic symptoms his main disability with his cerebral palsy is abnormal gait uses a cane and sometimes a walker  He fell today at home  The right lower extremity is Ace wrapped and splinted he is comfortable no significant pain he is able to smile and joke he already misses his dog.    Active Problems:    Closed displaced spiral fracture of shaft of right tibia, initial encounter (1/27/2022)    Spastic diplegic cerebral palsy (H) (10/15/2019)    Essential hypertension (10/15/2019)    Periodic limb movement disorder (PLMD) (2/2/2021)    Personal history of traumatic fracture (1/27/2022)    Ataxia (1/27/2022)    History of COVID-19 (1/27/2022) - neg PCR 1/27/22    Plan routine preoperative studies blood test etc. he seems to be very stable from a general medical standpoint EKG is currently pending surgical repair this afternoon and then home or placement post op DVT prophylaxis per consultants        DVTP: subcutaneous heparin Or aspirin or per orthopedic consultant postop  Code Status: Full Code  Disposition: Inpatient   Expected LOS: 2 days    Goals for the hospitalization: Orthopedic repair fracture    Chief Complaint  right tib-fib fracture     HISTORY     Dakota Wilkinson is a 51 year old old male with history of spastic cerebral palsy, lives with mother and stepfather, retired from computer work, several degrees from college, status post left tib-fib fracture with surgical repair 2017 presented with a right tib-fib fracture after falling at home today.  Sent to the ER by orthopedic clinic where x-rays confirmed a fracture.  The plan is for surgical repair later today  The patient's not had any active cardiac pulmonary or related issues he has had a history of 3 vaccines about Lambert for Covid and reports having had Covid approximately a year ago.  No recent cardiac pulmonary or neurologic symptoms his main disability with his cerebral palsy is abnormal gait uses a cane and sometimes a walker  He fell today at home    Past Medical History     Past Medical History:  7/14/2017: Closed displaced comminuted fracture of shaft of left tibia  No date: Hypertension     Surgical History     Past Surgical History:   Procedure Laterality Date     ORTHOPEDIC SURGERY  07/14/2017    IM ZARA TIBIA 7/14/2017 Leg Lower/LeftProcedure: IM ZARA LEFT TIBIA; Laterality: Left; Surgeon: Zaki Bonilla MD; Service: Orthopedics       ORTHOPEDIC SURGERY Bilateral 1993    multiple arthroscopies early 1990s     ORTHOPEDIC SURGERY Bilateral 1993    nerve 'tranposition'? both legs     Family History    Reviewed, and   Family History   Problem Relation Age of Onset     Diabetes Maternal Grandmother      Cerebrovascular Disease Maternal Grandmother      Dementia Maternal Grandmother      Cancer Maternal Grandfather      Diabetes Paternal Grandmother      Cerebrovascular Disease Paternal Grandmother      Colon Cancer Paternal Grandmother      Prostate Cancer Paternal Grandmother      Other Cancer Paternal Grandmother         Social History      Social History     Tobacco Use      Smoking status: Former Smoker     Packs/day: 0.00     Years: 0.00     Pack years: 0.00     Smokeless tobacco: Never Used   Substance Use Topics     Alcohol use: Yes     Comment: socially     Drug use: Never        Allergies     Allergies   Allergen Reactions     Amoxicillin      Codeine      Morphine Unknown     Penicillins      Percocet [Oxycodone-Acetaminophen]      Prior to Admission Medications      Prior to Admission Medications   Prescriptions Last Dose Informant Patient Reported? Taking?   baclofen (LIORESAL) 10 MG tablet   No No   Sig: Take 1 tablet (10 mg) by mouth 3 times daily   glycopyrrolate (ROBINUL) 1 MG tablet   Yes No   Sig: Take 1 mg by mouth 3 times daily    pramipexole (MIRAPEX) 0.125 MG tablet   No No   Sig: Take 1 tablet (0.125 mg) by mouth At Bedtime      Facility-Administered Medications: None      Review of Systems     A 12 point comprehensive review of systems was negative except as noted above in HPI.    PHYSICAL EXAMINATION       Vitals      Temp:  [98.3  F (36.8  C)] 98.3  F (36.8  C)  Pulse:  [105] 105  Resp:  [16] 16  BP: (147)/(103) 147/103  SpO2:  [100 %] 100 %    Examination     GENERAL:  Alert, appears comfortable, in no acute distress, appears stated age   HEAD:  Normocephalic, without obvious abnormality, atraumatic   EYES:  PERRL, conjunctiva/corneas clear, no scleral icterus, EOM's intact   NOSE: Nares normal, septum midline, mucosa normal, no drainage   THROAT: Lips, mucosa, and tongue normal; teeth and gums normal, mouth moist   NECK: Supple, symmetrical, trachea midline   BACK:   Symmetric, no curvature, ROM normal   LUNGS:   Clear to auscultation bilaterally, no rales, rhonchi, or wheezing, symmetric chest rise on inhalation, respirations unlabored   CHEST WALL:  No tenderness or deformity   HEART:  Regular rate and rhythm, S1 and S2 normal, no murmur, rub, or gallop    ABDOMEN:   Soft, non-tender, bowel sounds active all four quadrants, no masses, no organomegaly, no  rebound or guarding   EXTREMITIES:  Muscle spasticity noted right lower extremity is Ace wrapped and splinted   SKIN: Dry to touch, no exanthems in the visualized areas   NEURO: Alert, oriented x 4, moves all four extremities freely, non-focal spasticity   PSYCH: Cooperative, behavior is appropriate very pleasant affect     Pertinent Lab     Recent Results (from the past 24 hour(s))   Basic metabolic panel    Collection Time: 01/27/22  1:28 PM   Result Value Ref Range    Sodium 139 136 - 145 mmol/L    Potassium 3.7 3.5 - 5.0 mmol/L    Chloride 108 (H) 98 - 107 mmol/L    Carbon Dioxide (CO2) 20 (L) 22 - 31 mmol/L    Anion Gap 11 5 - 18 mmol/L    Urea Nitrogen 20 8 - 22 mg/dL    Creatinine 0.83 0.70 - 1.30 mg/dL    Calcium 8.6 8.5 - 10.5 mg/dL    Glucose 98 70 - 125 mg/dL    GFR Estimate >90 >60 mL/min/1.73m2   CBC (+ platelets, no diff)    Collection Time: 01/27/22  1:28 PM   Result Value Ref Range    WBC Count 11.4 (H) 4.0 - 11.0 10e3/uL    RBC Count 5.47 4.40 - 5.90 10e6/uL    Hemoglobin 15.7 13.3 - 17.7 g/dL    Hematocrit 46.7 40.0 - 53.0 %    MCV 85 78 - 100 fL    MCH 28.7 26.5 - 33.0 pg    MCHC 33.6 31.5 - 36.5 g/dL    RDW 13.2 10.0 - 15.0 %    Platelet Count 346 150 - 450 10e3/uL   INR    Collection Time: 01/27/22  1:28 PM   Result Value Ref Range    INR 1.02 0.85 - 1.15   PTT    Collection Time: 01/27/22  1:28 PM   Result Value Ref Range    aPTT 30 22 - 38 Seconds   Symptomatic; Unknown Influenza A/B & SARS-CoV2 (COVID-19) Virus PCR Multiplex Nasopharyngeal    Collection Time: 01/27/22  1:30 PM    Specimen: Nasopharyngeal; Swab   Result Value Ref Range    Influenza A PCR Negative Negative    Influenza B PCR Negative Negative    SARS CoV2 PCR Negative Negative           Pertinent Radiology     Radiology Results:   Recent Results (from the past 24 hour(s))   CT Ankle Right w/o Contrast    Narrative    EXAM: CT ANKLE RIGHT W/O CONTRAST  LOCATION: [facility]  DATE/TIME: 1/27/2022 2:01  PM    INDICATION: Fracture, ankle  COMPARISON: X-rays 1/27/2022  TECHNIQUE: Noncontrast. Axial, sagittal and coronal thin-section reconstruction. Dose reduction techniques were used.     FINDINGS: Acute mildly comminuted oblique fracture through the distal tibial shaft. There is posterolateral displacement of the main distal fragment by 1 cm, and mild overriding of the fragments. There is a small separate displaced cortical fragment   within the main fracture site. There is a nondisplaced linear cortical fracture within the distal portion of the main proximal segment. No extension of fracture distally to the articular surface.    Chronic ununited transverse fracture through the lateral malleolus inferior to the tibiotalar joint. Chronic ununited fracture of the anterior process talus. Tiny chronic bone fragment at the distal tip of medial malleolus and along the dorsal margin of   the talar head. Mild degenerative arthritis of the tibiotalar joint. There is prominent subchondral cystic change and deformity and defect of the subchondral bone of the talar dome laterally which is likely chronic and related to chronic osteochondral   injury-lesion. No widening of ankle mortise.    There is poorly defined hemorrhage and likely a small hematoma adjacent to the distal tibial shaft fracture. Subcutaneous soft tissue stranding along the medial and lateral aspects of the ankle and dorsum of the foot. Decreased density within portions of   the tibialis posterior tendon may represent tendinopathy. Chronic heterotopic ossification and the likely tendinopathy of the Achilles tendon.      Impression    IMPRESSION:  1.  Acute mildly comminuted displaced fracture of the distal tibial shaft.       EKG Results: personally reviewed. sinus poor R wave progress unlikely significant vertigo    Advance Care Planning        Wilmar Sheth MD  [facility]   Phone: #[phone]

## 2022-01-27 NOTE — ED TRIAGE NOTES
Patient states he was walking around the house yesterday around 1700, twisted his right leg, went into Ortho quick today and sent here for possible surgery. History of CP

## 2022-01-28 ENCOUNTER — APPOINTMENT (OUTPATIENT)
Dept: PHYSICAL THERAPY | Facility: CLINIC | Age: 52
DRG: 493 | End: 2022-01-28
Attending: STUDENT IN AN ORGANIZED HEALTH CARE EDUCATION/TRAINING PROGRAM
Payer: COMMERCIAL

## 2022-01-28 VITALS
TEMPERATURE: 97.5 F | HEIGHT: 65 IN | HEART RATE: 68 BPM | DIASTOLIC BLOOD PRESSURE: 71 MMHG | BODY MASS INDEX: 24.99 KG/M2 | OXYGEN SATURATION: 97 % | SYSTOLIC BLOOD PRESSURE: 131 MMHG | WEIGHT: 150 LBS | RESPIRATION RATE: 18 BRPM

## 2022-01-28 LAB
GLUCOSE BLD-MCNC: 196 MG/DL (ref 70–125)
HGB BLD-MCNC: 12.8 G/DL (ref 13.3–17.7)

## 2022-01-28 PROCEDURE — 82947 ASSAY GLUCOSE BLOOD QUANT: CPT | Performed by: STUDENT IN AN ORGANIZED HEALTH CARE EDUCATION/TRAINING PROGRAM

## 2022-01-28 PROCEDURE — 250N000011 HC RX IP 250 OP 636: Performed by: STUDENT IN AN ORGANIZED HEALTH CARE EDUCATION/TRAINING PROGRAM

## 2022-01-28 PROCEDURE — 250N000013 HC RX MED GY IP 250 OP 250 PS 637: Performed by: STUDENT IN AN ORGANIZED HEALTH CARE EDUCATION/TRAINING PROGRAM

## 2022-01-28 PROCEDURE — G0008 ADMIN INFLUENZA VIRUS VAC: HCPCS | Performed by: STUDENT IN AN ORGANIZED HEALTH CARE EDUCATION/TRAINING PROGRAM

## 2022-01-28 PROCEDURE — 97530 THERAPEUTIC ACTIVITIES: CPT | Mod: GP

## 2022-01-28 PROCEDURE — 90682 RIV4 VACC RECOMBINANT DNA IM: CPT | Performed by: STUDENT IN AN ORGANIZED HEALTH CARE EDUCATION/TRAINING PROGRAM

## 2022-01-28 PROCEDURE — 85018 HEMOGLOBIN: CPT | Performed by: STUDENT IN AN ORGANIZED HEALTH CARE EDUCATION/TRAINING PROGRAM

## 2022-01-28 PROCEDURE — 36415 COLL VENOUS BLD VENIPUNCTURE: CPT | Performed by: STUDENT IN AN ORGANIZED HEALTH CARE EDUCATION/TRAINING PROGRAM

## 2022-01-28 PROCEDURE — 99231 SBSQ HOSP IP/OBS SF/LOW 25: CPT | Performed by: FAMILY MEDICINE

## 2022-01-28 PROCEDURE — 97161 PT EVAL LOW COMPLEX 20 MIN: CPT | Mod: GP

## 2022-01-28 PROCEDURE — 250N000013 HC RX MED GY IP 250 OP 250 PS 637: Performed by: PHYSICIAN ASSISTANT

## 2022-01-28 PROCEDURE — 97116 GAIT TRAINING THERAPY: CPT | Mod: GP

## 2022-01-28 PROCEDURE — 250N000013 HC RX MED GY IP 250 OP 250 PS 637: Performed by: INTERNAL MEDICINE

## 2022-01-28 RX ORDER — ACETAMINOPHEN 500 MG
500-1000 TABLET ORAL EVERY 8 HOURS PRN
Qty: 60 TABLET | Refills: 0 | Status: SHIPPED | OUTPATIENT
Start: 2022-01-28 | End: 2022-10-06

## 2022-01-28 RX ORDER — ASPIRIN 325 MG
325 TABLET, DELAYED RELEASE (ENTERIC COATED) ORAL DAILY
Qty: 30 TABLET | Refills: 0 | Status: SHIPPED | OUTPATIENT
Start: 2022-01-28 | End: 2022-10-06

## 2022-01-28 RX ORDER — HYDROMORPHONE HYDROCHLORIDE 2 MG/1
2-4 TABLET ORAL EVERY 6 HOURS PRN
Qty: 15 TABLET | Refills: 0 | Status: SHIPPED | OUTPATIENT
Start: 2022-01-28 | End: 2022-10-06

## 2022-01-28 RX ORDER — AMOXICILLIN 250 MG
1 CAPSULE ORAL 2 TIMES DAILY
Qty: 30 TABLET | Refills: 0 | Status: SHIPPED | OUTPATIENT
Start: 2022-01-28 | End: 2022-10-06

## 2022-01-28 RX ADMIN — ASPIRIN 325 MG: 325 TABLET, COATED ORAL at 07:47

## 2022-01-28 RX ADMIN — INFLUENZA A VIRUS A/WISCONSIN/588/2019 (H1N1) RECOMBINANT HEMAGGLUTININ ANTIGEN, INFLUENZA A VIRUS A/TASMANIA/503/2020 (H3N2) RECOMBINANT HEMAGGLUTININ ANTIGEN, INFLUENZA B VIRUS B/WASHINGTON/02/2019 RECOMBINANT HEMAGGLUTININ ANTIGEN, AND INFLUENZA B VIRUS B/PHUKET/3073/2013 RECOMBINANT HEMAGGLUTININ ANTIGEN 0.5 ML: 45; 45; 45; 45 INJECTION INTRAMUSCULAR at 10:03

## 2022-01-28 RX ADMIN — CLINDAMYCIN PHOSPHATE 600 MG: 600 INJECTION, SOLUTION INTRAVENOUS at 01:27

## 2022-01-28 RX ADMIN — ACETAMINOPHEN 1000 MG: 500 TABLET ORAL at 11:31

## 2022-01-28 RX ADMIN — HYDROMORPHONE HYDROCHLORIDE 2 MG: 2 TABLET ORAL at 04:29

## 2022-01-28 RX ADMIN — CLINDAMYCIN PHOSPHATE 600 MG: 600 INJECTION, SOLUTION INTRAVENOUS at 09:07

## 2022-01-28 RX ADMIN — ACETAMINOPHEN 1000 MG: 500 TABLET ORAL at 02:20

## 2022-01-28 RX ADMIN — BACLOFEN 10 MG: 10 TABLET ORAL at 07:48

## 2022-01-28 ASSESSMENT — ACTIVITIES OF DAILY LIVING (ADL)
ADLS_ACUITY_SCORE: 10
ADLS_ACUITY_SCORE: 8
ADLS_ACUITY_SCORE: 6

## 2022-01-28 NOTE — PLAN OF CARE
Patient vital signs are at baseline: Yes  Patient able to ambulate as they were prior to admission or with assist devices provided by therapies during their stay:  Yes  Patient MUST void prior to discharge:  Yes  Patient able to tolerate oral intake:  Yes  Pain has adequate pain control using Oral analgesics:  Yes    Patient will discharge home today.

## 2022-01-28 NOTE — ANESTHESIA POSTPROCEDURE EVALUATION
Patient: Dakota Wilkinson    Procedure: Procedure(s):  OPEN REDUCTION INTERNAL FIXATION, FRACTURE, TIBIA, USING INTRAMEDULLARY ZARA       Diagnosis:Closed displaced spiral fracture of shaft of right tibia, initial encounter [S82.816A]  Diagnosis Additional Information: No value filed.    Anesthesia Type:  Spinal    Note:  Disposition: Inpatient   Postop Pain Control: Uneventful            Sign Out: Well controlled pain   PONV: No   Neuro/Psych: Uneventful            Sign Out: Acceptable/Baseline neuro status   Airway/Respiratory: Uneventful            Sign Out: Acceptable/Baseline resp. status   CV/Hemodynamics: Uneventful            Sign Out: Acceptable CV status; No obvious hypovolemia; No obvious fluid overload   Other NRE: NONE   DID A NON-ROUTINE EVENT OCCUR? No           Last vitals:  Vitals Value Taken Time   /69 01/27/22 2110   Temp 36.1  C (97  F) 01/27/22 2100   Pulse 79 01/27/22 2112   Resp 28 01/27/22 2110   SpO2 98 % 01/27/22 2112   Vitals shown include unvalidated device data.    Electronically Signed By: LAN BRIDGES MD  January 27, 2022  9:16 PM

## 2022-01-28 NOTE — ANESTHESIA PROCEDURE NOTES
Intrathecal injection Procedure Note    Pre-Procedure   Staff -        Anesthesiologist:  Zaki Saucedo MD       Performed By: anesthesiologist       Location: OR       Procedure Start/Stop Times: 1/27/2022 6:24 PM and 1/27/2022 6:28 PM       Pre-Anesthestic Checklist: patient identified, IV checked, risks and benefits discussed, informed consent, monitors and equipment checked, pre-op evaluation, at physician/surgeon's request and post-op pain management  Timeout:       Correct Patient: Yes        Correct Procedure: Yes        Correct Site: Yes        Correct Position: Yes   Procedure Documentation  Procedure: intrathecal injection       Patient Position: sitting       Patient Prep/Sterile Barriers: sterile gloves, mask, patient draped       Skin prep: Chloraprep       Insertion Site: L2-3. (midline approach).       Needle Gauge: 25.        Needle Length (Inches): 3.5        Spinal Needle Type: Pencan       Introducer used       # of attempts: 1 and  # of redirects:  1    Assessment/Narrative         Paresthesias: No.       CSF fluid: clear.    Medication(s) Administered   0.75% Hyperbaric Bupivacaine (Intrathecal), 1.6 mL  Medication Administration Time: 1/27/2022 6:27 PM     Comments:  Lot  [id number]

## 2022-01-28 NOTE — ANESTHESIA PREPROCEDURE EVALUATION
Anesthesia Pre-Procedure Evaluation    Patient: Dakota Wilkinson   MRN: [id number] : 1970        Preoperative Diagnosis: Closed displaced spiral fracture of shaft of right tibia, initial encounter [S82.097A]    Procedure : Procedure(s):  OPEN REDUCTION INTERNAL FIXATION, FRACTURE, TIBIA, USING INTRAMEDULLARY ZARA          Past Medical History:   Diagnosis Date     Closed displaced comminuted fracture of shaft of left tibia 2017     Hypertension       Past Surgical History:   Procedure Laterality Date     ORTHOPEDIC SURGERY  2017    IM ZARA TIBIA 2017 Leg Lower/LeftProcedure: IM ZARA LEFT TIBIA; Laterality: Left; Surgeon: Zaki Bonilla MD; Service: Orthopedics       ORTHOPEDIC SURGERY Bilateral     multiple arthroscopies early      ORTHOPEDIC SURGERY Bilateral     nerve 'tranposition'? both legs      Allergies   Allergen Reactions     Amoxicillin Hives and Itching     Codeine Hives and Itching     Morphine Hives and Itching     Pt does tolerate dilaudid     Penicillins Hives and Itching     Percocet [Oxycodone-Acetaminophen]      Passed out      Social History     Tobacco Use     Smoking status: Former Smoker     Packs/day: 0.00     Years: 0.00     Pack years: 0.00     Smokeless tobacco: Never Used   Substance Use Topics     Alcohol use: Yes     Comment: socially      Wt Readings from Last 1 Encounters:   22 68 kg (150 lb)        Anesthesia Evaluation   Pt has had prior anesthetic. Type: Regional and General.    No history of anesthetic complications       ROS/MED HX  ENT/Pulmonary:  - neg pulmonary ROS     Neurologic: Comment: CP  Has some baseline LE  numbness      Cardiovascular:  - neg cardiovascular ROS   (+) hypertension-----    METS/Exercise Tolerance:     Hematologic:  - neg hematologic  ROS     Musculoskeletal: Comment: CP  (+) fracture, Fracture location: RLE,     GI/Hepatic:  - neg GI/hepatic ROS     Renal/Genitourinary:  - neg Renal ROS     Endo:  - neg endo ROS      Psychiatric/Substance Use:  - neg psychiatric ROS     Infectious Disease:  - neg infectious disease ROS     Malignancy:  - neg malignancy ROS     Other:            Physical Exam    Airway        Mallampati: I   TM distance: > 3 FB   Neck ROM: full   Mouth opening: > 3 cm    Respiratory Devices and Support         Dental  no notable dental history         Cardiovascular   cardiovascular exam normal          Pulmonary   pulmonary exam normal                OUTSIDE LABS:  CBC:   Lab Results   Component Value Date    WBC 11.4 (H) 01/27/2022    HGB 15.7 01/27/2022    HCT 46.7 01/27/2022     01/27/2022     BMP:   Lab Results   Component Value Date     01/27/2022    POTASSIUM 3.7 01/27/2022    CHLORIDE 108 (H) 01/27/2022    CO2 20 (L) 01/27/2022    BUN 20 01/27/2022    CR 0.83 01/27/2022    GLC 98 01/27/2022     COAGS:   Lab Results   Component Value Date    PTT 30 01/27/2022    INR 1.02 01/27/2022     POC: No results found for: BGM, HCG, HCGS  HEPATIC: No results found for: ALBUMIN, PROTTOTAL, ALT, AST, GGT, ALKPHOS, BILITOTAL, BILIDIRECT, MARGO  OTHER:   Lab Results   Component Value Date    HARVINDER 8.6 01/27/2022       Anesthesia Plan    ASA Status:  3, emergent       Anesthesia Type: Spinal.              Consents    Anesthesia Plan(s) and associated risks, benefits, and realistic alternatives discussed. Questions answered and patient/representative(s) expressed understanding.     - Discussed: Risks, Benefits and Alternatives for the PROCEDURE were discussed     - Discussed with:  Patient         Postoperative Care    Pain management: IV analgesics.   PONV prophylaxis: Ondansetron (or other 5HT-3), Dexamethasone or Solumedrol     Comments:    Other Comments: Patient requests spinal anesthetic.  No nerve blocks per surgeon due to concern for compartment syndrome            LAN BRIDGES MD

## 2022-01-28 NOTE — PROGRESS NOTES
01/28/22 0830   Quick Adds   Type of Visit Initial PT Evaluation   Living Environment   People in home alone   Current Living Arrangements   (lower level)   Home Accessibility stairs to enter home;stairs within home   Number of Stairs, Main Entrance 2   Stair Railings, Main Entrance railing on right side (ascending)   Number of Stairs, Within Home, Primary 5   Stair Railings, Within Home, Primary railings on both sides of stairs   Self-Care   Equipment Currently Used at Home wheelchair, manual;walker, standard;walker, rolling;crutches;cane, straight   Disability/Function   Fall history within last six months yes   Change in Functional Status Since Onset of Current Illness/Injury yes   General Information   Onset of Illness/Injury or Date of Surgery 01/27/22   Patient/Family Therapy Goals Statement (PT) go home   Existing Precautions/Restrictions fall;weight bearing   Weight-Bearing Status - RLE nonweight-bearing  (foot)   Bed Mobility   Bed Mobility No deficits identified   Transfers   Transfers sit-stand transfer;bed-chair transfer   Bed-Chair Transfer   Bed-Chair [place] (Transfers) modified independence;verbal cues   Assistive Device (Bed-Chair Transfers) wheelchair   Sit-Stand Transfer   Sit-Stand [place] (Transfers) modified independence;verbal cues   Assistive Device (Sit-Stand Transfers) walker, standard   Gait/Stairs (Locomotion)   [place] Level (Gait) supervision   Assistive Device (Gait) walker, standard   Distance in Feet (Required for LE Total Joints) 3   Pattern (Gait) step-to   Deviations/Abnormal Patterns (Gait) stride length decreased   Maintains Weight-bearing Status (Gait) able to maintain   Negotiation (Stairs) stairs independence;handrail location;number of steps;other (see comments)   [place] Level (Stairs) supervision;verbal cues   Handrail Location (Stairs) right side (ascending)   Number of Steps (Stairs) 2   Comment (Gait/Stairs) floor <> seated on step, partial SPT,  scoot up/down on bottom   Clinical Impression   Criteria for Skilled Therapeutic Intervention yes, treatment indicated   PT Diagnosis (PT) impaired functional mobility   Influenced by the following impairments transfers, gait, stairs   Functional limitations due to impairments NWB restriction, impaired motor control   Clinical Presentation Stable/Uncomplicated   Clinical Presentation Rationale pt presents as medically diagnosed   Clinical Decision Making (Complexity) low complexity   Therapy Frequency (PT) One time eval and treatment only   Predicted Duration of Therapy Intervention (days/wks) 1 day   Planned Therapy Interventions (PT) gait training;patient/family education;stair training;transfer training   Anticipated Equipment Needs at Discharge (PT) wheelchair   Risk & Benefits of therapy have been explained evaluation/treatment results reviewed;care plan/treatment goals reviewed;patient   PT Discharge Planning    PT Discharge Recommendation (DC Rec) home with assist   PT Rationale for DC Rec assist for household management, supervision on stairs   Total Evaluation Time   Total Evaluation Time (Minutes) 10

## 2022-01-28 NOTE — PLAN OF CARE
Patient vital signs are at baseline: Yes  Patient able to ambulate as they were prior to admission or with assist devices provided by therapies during their stay:  No,  Reason:  has not gotten OOB  Patient MUST void prior to discharge:  No,  Reason: due to void  Patient able to tolerate oral intake:  Yes  Pain has adequate pain control using Oral analgesics:  Yes      Patient came to the floor at 1930. Denies pain. Sensation improving in LE, now able to slightly wiggle toes. Capillary refill<3 seconds, pedal pulses +2, cool to the touch. Denies pain. Tolerating clear liquid diet, will attempt to advance to regular. Denies nausea. Has not gotten OOB, will be NWB RLE. RLE splinted and ace wrap, CDI. Due to void, no urge yet. Will continue to monitor.

## 2022-01-28 NOTE — PROGRESS NOTES
Nurse teaching given on 1/28/2022 and the patient expresses understanding and acceptance of instructions. Nataly Weir RN 1/28/2022 11:47 AM

## 2022-01-28 NOTE — DISCHARGE SUMMARY
ORTHOPEDIC DISCHARGE SUMMARY       Dakota Wilkinson,  1970, MRN [id number]    Admission Date: 2022  Admission Diagnoses: Spastic diplegic cerebral palsy (H) [G80.1]  Closed displaced spiral fracture of shaft of right tibia, initial encounter [S82.241A]  Fall, initial encounter [W19.XXXA]  Tibia/fibula fracture, right, closed, initial encounter [S82.201A, S82.401A]     Discharge Date:  2022    Post-operative Day:  1 Day Post-Op    Reason for Admission: The patient was admitted for the following:  Procedure(s):  OPEN REDUCTION INTERNAL FIXATION, FRACTURE, TIBIA, USING INTRAMEDULLARY ZARA      BRIEF HOSPITAL COURSE   This 51 year old male underwent the aforementioned procedure with Dr. Fonseca on 22. There were no intraoperative complications and the patient was transferred to the recovery room and later the orthopedic unit in stable condition. Once the patient reached the orthopedic floor our orthopedic pain protocol was implemented along with the following:    Anticoagulation Medications: ASA  Therapy: PT and OT  Activity: NWB  Bracing: Bulky Mario Splint    Consultations during Admission: Hospitalist service for medical management     COMPLICATIONS/SIGNIFICANT FINDINGS    none    DISCHARGE INFORMATION   Condition at discharge: Good  Discharge destination: Home  Patient was seen by myself on the date of discharge.    FOLLOW UP CARE   Follow up with orthopedics in 2 weeks or sooner should the need arise. Ortho will continue to manage pain control, post op anticoagulation and incision care.     Follow up with your PCP for management of chronic medical problems and to evaluate for post op medical complications including constipation, nausea/vomiting, DVT/PE, anemia, changes in blood pressure, fevers/chills, urinary retention and atelectasis/pneumonia.     Subjective   Patient is doing well today. Patient has passed his therapies. He is using Dilaudid for pain which he is tolerating well. He feels ready  "to discharge home. No other complaints. All questions answered.       Physical Exam   The patient is A&Ox3. Appears comfortable.   Sensation is intact.  Able to wiggle the toes.   Brisk capillary refill in the toes.   Splint is C/D/I.     /71 (BP Location: Left arm)   Pulse 68   Temp 97.5  F (36.4  C) (Oral)   Resp 18   Ht 1.651 m (5' 5\")   Wt 68 kg (150 lb)   SpO2 97%   BMI 24.96 kg/m      Pertinent Results at Discharge     Hemoglobin   Date/Time Value Ref Range Status   01/28/2022 09:21 AM 12.8 (L) 13.3 - 17.7 g/dL Final   01/27/2022 01:28 PM 15.7 13.3 - 17.7 g/dL Final     INR   Date/Time Value Ref Range Status   01/27/2022 01:28 PM 1.02 0.85 - 1.15 Final     Platelet Count   Date/Time Value Ref Range Status   01/27/2022 01:28  150 - 450 10e3/uL Final       Problem List   Active Problems:    Spastic diplegic cerebral palsy (H)    Essential hypertension    Periodic limb movement disorder (PLMD)    Closed displaced spiral fracture of shaft of right tibia, initial encounter    Personal history of traumatic fracture    Ataxia    History of COVID-19      Stephanie Martinez PA-C  Date: 1/28/2022  Time: 10:08 AM    "

## 2022-01-28 NOTE — OP NOTE
Operative Note    Name:  Dakota Wilkinson  PCP:  Sunita Dumont  Procedure Date:  1/27/2022      Pre-Procedure Diagnosis:  Closed displaced spiral fracture of shaft of right tibia, initial encounter [S82.702A]   Closed right fibular shaft fracture    Post-Procedure Diagnosis:    Same    Procedure: Procedure(s):  1.  Intramedullary nail fixation right tibial shaft fracture  2.  Closed treatment right fibular shaft fracture    Surgeon(s):  Armen Fonseca MD    Assistant: Jesus Leon PA-C.  The PA's assistance was necessary in retraction of soft tissues, manipulation of the extremity and maintenance of the reduction during intramedullary nail placement, closure of the wound, splint placement and for overall efficiency and progression of the case.    Anesthesia Type:  Spinal with sedation    Estimated Blood Loss:   150 cc    Specimens: None    Complications:    None    Implants: Synthes intramedullary tibial nail 9 mm x 315 mm.  4 mm interlocking screws 2 proximal and 2 distally.    Indications for procedure: The patient is a pleasant 51-year-old male with spastic cerebral palsy.  He sustained a fall yesterday evening.  He presented to [facility] in [place] this morning and was diagnosed with a tibial shaft fracture.  He was placed into a long-leg splint and sent to the emergency department for further evaluation and management.  I evaluated in the emergency department and we discussed the risk, benefits, and alternatives to intramedullary nail fixation of the patient's tibial shaft fracture.  He had a CT scan confirming no intra-articular extension. The patient underwent medical optimization and was cleared for surgery by the hospitalist. I met with the patient today at the bedside and discussed the risks and benefits of moving forward, including but not limited to bleeding, infection, damage to blood vessels, nerves, tendons, stroke, heart attack, blood clot and death.  We discussed the risk of compartment  syndrome, prominent hardware, nonunion, malunion, and need for further surgery.  Ultimately, the patient elected to proceed.  Signed informed consent was obtained and placed in the chart.    Procedure: The patient was met in the preoperative holding area.  The correct surgical site was marked with the patient's participation.  Informed consent was again reviewed with the patient, and all questions were answered to his satisfaction.  The patient was transferred into the operating theater and placed supine on the operating table.  He had successful induction of spinal anesthesia by our anesthesia colleagues.  A bump was placed under the right hip.  A tourniquet was applied to the proximal right thigh but it was not insufflated throughout the case.  The right lower extremity was then prepped and draped in the usual sterile fashion.  A timeout was performed with all members the operating team participating per hospital policy.  The correct patient, site, and procedure were all confirmed.  All in attendance were in agreement.  Preoperative antibiotic administration was confirmed.    A longitudinal incision was made just proximal to the patella centered over the quad tendon.  Electrocautery was used to dissect through the subcutaneous fat.  The quad tendon was cleared of all subcutaneous tissue.  A 15 blade was used to make a longitudinal incision through the center of the quad tendon in line with the skin incision.  Clear synovial fluid was expressed from the knee.  The trocar with the silicone sleeve to protect the patellofemoral joint was then inserted through the patellofemoral joint to the proximal tibia.  The proper starting point for the tibial nail was localized on AP and lateral images.  The starting guidewire was then inserted again in the appropriate starting point.  It was advanced into the proximal tibia.  AP and lateral images confirmed adequate starting point.  The opening reamer was then used and the pin  was removed.  A ball-tipped guidewire was then inserted through the trocar into the proximal tibia and advanced down to the fracture site.    Attention was then turned to reduction of the fracture.  An attempt was made at closed reduction with traction and manipulation of the limb.  This was unsuccessful.  Using C-arm guidance, percutaneous stab incisions was made over the anterolateral distal tibia, and the medial tibia centered over the fracture site.  A hemostat was used to dissect down with care to stay on bone on both the medial and lateral sides.  A pointed reduction forceps was introduced under C arm guidance, and using a combination of traction, rotation, manipulation of the limb and the clamp, the fracture was reduced into acceptable position.  AP and lateral images confirmed an adequate reduction.    The ball-tipped guidewire was then advanced past the fracture site.  There was a cortical fragment that had displaced into the canal, but the ball-tipped guidewire passed easily.  It was placed in the center of the distal tibia on both the AP and lateral views, and embedded in the physeal scar.  Flexible reamer was then used starting at an 8.5 mm reamer reaming up to 10.5 mm first by 1 mm increments and then by 0.5 mm increments.  There was some increased cortical chatter where the cortical fragment was flipped into the canal, but the reamers passed easily with no displacement of the fracture site.    A lateral image of the knee was taken, and the appropriate measurement for the nail was taken.  A 9 mm x 315 mm nail was then opened.  It was placed over the ball-tipped guidewire and impacted into place.  Maintenance of reduction of the fracture was observed under C arm as the nail was passed.  The nail was passed distally, and confirmed to be embedded in the proximal tibia and buried in the bone.  Both medial to lateral distal interlocking screw holes were confirmed to be past the fracture site.    The  appropriate aiming arm was then attached to the proximal tibia.  Drill sleeve was advanced down, and a 15 blade scalpel was used to made a percutaneous incision.  The sleeve was advanced down to bone.  The appropriate drill bit was used to drill across the nail in the static position.  A proximal interlocking screw of appropriate length was then placed.  This process was repeated for a second medial to lateral interlocking screw.  C-arm imaging confirmed adequate length of the screws and placement through the nail.    Attention was turned to the distal interlocking screws.  A perfect circles technique was used with the C arm.  A 15 blade scalpel was used to make an incision over the medial distal tibia.  A hemostat was used to dissect down to the periosteum.  The appropriate drill bit was used using C arm guidance to drill from medial to lateral.  An interlocking screw of appropriate length was then placed.  This process was repeated for another distal interlocking screw.  Both screws had adequate purchase.  C-arm images on the AP and lateral confirmed an adequate length of the screw, and placement through the nail.  The clamp was then removed from the fracture site.    AP and lateral images proximally and distally confirmed adequate reduction of the fracture, and adequate placement of the implants.    The wounds were then copiously irrigated.  The knee joint was flushed with copious amounts of sterile saline.  The quad tendon was closed with 0 Vicryl in interrupted fashion.  The subcutaneous layers were closed with 2-0 Vicryl in interrupted fashion.  The skin was closed with 3-0 nylon in interrupted fashion.  The leg was then cleaned and dried.  The leg compartments were noted to be mildly swollen but compressible at the end of the case.  There is adequate capillary refill in the toes similar to preoperatively.    A sterile dressing of Adaptic, 4 x 4 gauze, ABD pads, and sterile cast padding was applied.  CARLOS Mario  compression dressing with posterior and U fiberglass splints with the ankle in neutral dorsiflexion was then applied.  The patient was then awakened from anesthesia and transferred to the PACU in good condition.  All sponge and needle counts were correct at the end of the case.      POSTOPERATIVE PLAN  The patient will be nonweightbearing on the right lower extremity.  His splint will remain clean dry and intact until follow-up.  He will receive 24 hours of IV clindamycin for infection prophylaxis.  He will begin aspirin 325 mg daily starting tomorrow for DVT prophylaxis.  He will be monitored overnight for signs and symptoms of compartment syndrome.  He will likely discharge to home tomorrow after work with physical therapy and occupational therapy.  He will follow-up in 2 weeks for wound check, suture removal, and radiographs.    Armen Fonseca MD    Date: 1/27/2022  Time: 8:47 PM      Implant Name Type Inv. Item Serial No.  Lot No. LRB No. Used Action   IMP NAIL SYN CAN TIBIAL [id number] 04.004.343S - [id number] [employer]/[place] IMP NAIL SYN CAN TIBIAL [id number] 04.004.343S  [facility] [id number] Right 1 Implanted   IMP SCR SYN 4.0MM LOCK T25 STARDRIVE 32MM TI 04.005.422 - [id number] Met[employer]ware/[place] IMP SCR SYN 4.0MM LOCK T25 STARDRIVE 32MM TI 04.005.422  [facility]  Right 1 Implanted   IMP SCR SYN 4.0MM LOCK T25 STARDRIVE 34MM TI 04.005.424 - [id number] Met[employer] IMP SCR SYN 4.0MM LOCK T25 STARDRIVE 34MM TI 04.005.424  [facility]  Right 1 Implanted   IMP SCR SYN 4.0MM LOCK T25 STARDRIVE 38MM TI 04.005.428 - [id number] Metallic Hardware/[place] IMP SCR SYN 4.0MM LOCK T25 STARDRIVE 38MM TI 04.005.428  [facility]  Right 1 Implanted

## 2022-01-28 NOTE — ANESTHESIA CARE TRANSFER NOTE
Patient: Dakota Wilkinson    Procedure: Procedure(s):  OPEN REDUCTION INTERNAL FIXATION, FRACTURE, TIBIA, USING INTRAMEDULLARY ZARA       Diagnosis: Closed displaced spiral fracture of shaft of right tibia, initial encounter [S82.037A]  Diagnosis Additional Information: No value filed.    Anesthesia Type:   Spinal     Note:    Oropharynx: oropharynx clear of all foreign objects  Level of Consciousness: awake  Oxygen Supplementation: face mask  Level of Supplemental Oxygen (L/min / FiO2): 6  Independent Airway: airway patency satisfactory and stable    Vital Signs Stable: post-procedure vital signs reviewed and stable  Report to RN Given: handoff report given  Patient transferred to: PACU    Handoff Report: Identifed the Patient, Identified the Reponsible Provider, Reviewed the pertinent medical history, Discussed the surgical course, Reviewed Intra-OP anesthesia mangement and issues during anesthesia, Set expectations for post-procedure period and Allowed opportunity for questions and acknowledgement of understanding      Vitals:  Vitals Value Taken Time   /58 01/27/22 2042   Temp 36  C (96.8  F) 01/27/22 2041   Pulse 70 01/27/22 2044   Resp 13 01/27/22 2044   SpO2 99 % 01/27/22 2044   Vitals shown include unvalidated device data.    Electronically Signed By: CARLOS Gomez CRNA  January 27, 2022  8:45 PM

## 2022-01-28 NOTE — PLAN OF CARE
Patient vital signs are at baseline: Yes  Patient able to ambulate as they were prior to admission or with assist devices provided by therapies during their stay:  No,  Reason:  NWB RLE  Patient MUST void prior to discharge:  Yes  Patient able to tolerate oral intake:  Yes  Pain has adequate pain control using Oral analgesics:  Yes      Problem: Adult Inpatient Plan of Care  Goal: Optimal Comfort and Wellbeing  1/28/2022 0623 by Maci Montiel RN  Outcome: Improving     VSS on room air. Voiding adequately with urinal at bedside. PRN 2mg oral dilaudid and PRN tylenol given for moderate 6/10 pain in RLE. Pain 3/10 upon reassessment.     RLE splinted and ace wrapped. CDI. CMS intact. Tolerating regular diet. IV antibiotics infused overnight. Plan to discharge home pending therapy evaluation.

## 2022-01-29 NOTE — PROGRESS NOTES
[facility]    Medicine Progress Note - Hospitalist Service    Date of Admission:  1/27/2022    Assessment & Plan            Patients medical problems are stable.  No further treatment indicated.  Agree with discharge per Ortho plan.  Patient would like to inquire further about PCA services at home, not home care.       Diet: Discharge Instruction - Regular Diet Adult    DVT Prophylaxis: Defer to primary service  Dias Catheter: Not present  Central Lines: None  Cardiac Monitoring: None  Code Status:   Full    Disposition Plan   Expected Discharge: 01/28/2022            The patient's care was discussed with the Bedside Nurse and Patient.    Qamar Canchola MD  Hospitalist Service  [facility]  Securely message with the Vocera Web Console (learn more here)  Text page via rumr Paging/Directory         Clinically Significant Risk Factors Present on Admission                    ______________________________________________________________________    Interval History   Doing well, anxious to go home.  Pain controlled.    Data reviewed today: I reviewed all medications, new labs and imaging results over the last 24 hours. I personally reviewed     Physical Exam   Vital Signs: Temp: 97.5  F (36.4  C) Temp src: Oral BP: 131/71 Pulse: 68   Resp: 18 SpO2: 97 % O2 Device: None (Room air) Oxygen Delivery: 6 LPM  Weight: 150 lbs 0 oz  Constitutional: awake, alert, cooperative, no apparent distress, and appears stated age  Respiratory: No increased work of breathing, good air exchange, clear to auscultation bilaterally, no crackles or wheezing  Cardiovascular: no edema  Skin: no jaundice  Musculoskeletal: Right foot bandaged  Neurologic: Awake, alert, oriented to name, place and time.  Cranial nerves II-XII are grossly intact.  Motor is 5 out of 5 bilaterally.  Cerebellar finger to nose, heel to shin intact.  Sensory is intact.  Babinski down going, Romberg negative, and  "gait is normal.    Data   Recent Labs   Lab 01/28/22  0921 01/27/22  1328   WBC  --  11.4*   HGB 12.8* 15.7   MCV  --  85   PLT  --  346   INR  --  1.02   NA  --  139   POTASSIUM  --  3.7   CHLORIDE  --  108*   CO2  --  20*   BUN  --  20   CR  --  0.83   ANIONGAP  --  11   HARVINDER  --  8.6   * 98     Recent Results (from the past 24 hour(s))   POC US Guidance Needle Placement    Narrative    Ultrasound was performed as guidance to an anesthesia procedure.  Click   \"PACS images\" hyperlink below to view any stored images.  For specific   procedure details, view procedure note authored by anesthesia.   XR Surgery JESSICA  Fluoro G/T 5 Min    Narrative    This exam was marked as non-reportable because it will not be read by a   radiologist or a [facility] non-radiologist provider.           Medications       "

## 2022-01-31 ENCOUNTER — TELEPHONE (OUTPATIENT)
Dept: FAMILY MEDICINE | Facility: CLINIC | Age: 52
End: 2022-01-31
Payer: COMMERCIAL

## 2022-01-31 NOTE — TELEPHONE ENCOUNTER
Patient and friend Tiffanie is calling in regards to a patient request. Patient gave verbal permission that it is ok for writer to speak with Tiffanie regarding medical information.     Patient stated that he broke 2 bones in his right leg. Tiffanie stated that patient underwent surgery on right leg on 1/27/22 and was admitted in the hospital on 1/27/22 and discharged on 1/28/22.     Tiffanie, friend, and patient have concerns at that patient is disabled with cerebral palsy and living at home alone it is hard for patient to care for self and get around. Patient is requesting to have a PCA to help out. Writer advised that patient will need to make an office visit with PCP. Patient verbalized understanding. Hospital follow up visit was made for 2/2/22. No further questions or concerns at this time.     Shayla Guevara RN, BSN  [facility]

## 2022-02-02 ENCOUNTER — TELEPHONE (OUTPATIENT)
Dept: FAMILY MEDICINE | Facility: CLINIC | Age: 52
End: 2022-02-02

## 2022-02-02 ENCOUNTER — OFFICE VISIT (OUTPATIENT)
Dept: FAMILY MEDICINE | Facility: CLINIC | Age: 52
End: 2022-02-02
Payer: COMMERCIAL

## 2022-02-02 VITALS
HEIGHT: 65 IN | HEART RATE: 86 BPM | DIASTOLIC BLOOD PRESSURE: 85 MMHG | SYSTOLIC BLOOD PRESSURE: 125 MMHG | WEIGHT: 149.91 LBS | TEMPERATURE: 97.6 F | BODY MASS INDEX: 24.98 KG/M2 | OXYGEN SATURATION: 97 %

## 2022-02-02 DIAGNOSIS — G80.1 SPASTIC DIPLEGIC CEREBRAL PALSY (H): ICD-10-CM

## 2022-02-02 DIAGNOSIS — I10 ESSENTIAL HYPERTENSION: ICD-10-CM

## 2022-02-02 DIAGNOSIS — S82.241D CLOSED DISPLACED SPIRAL FRACTURE OF SHAFT OF RIGHT TIBIA WITH ROUTINE HEALING, SUBSEQUENT ENCOUNTER: Primary | ICD-10-CM

## 2022-02-02 PROCEDURE — 99214 OFFICE O/P EST MOD 30 MIN: CPT | Performed by: PREVENTIVE MEDICINE

## 2022-02-02 ASSESSMENT — PAIN SCALES - GENERAL: PAINLEVEL: EXTREME PAIN (8)

## 2022-02-02 ASSESSMENT — MIFFLIN-ST. JEOR: SCORE: 1466.88

## 2022-02-02 NOTE — PROGRESS NOTES
Assessment & Plan     Closed displaced spiral fracture of shaft of right tibia with routine healing, subsequent encounter  -history of fall and subsequent fracture with recent hospital admission and surgical correction  -has follow up with Orthopedics on 2/10/22  -taking Dilaudid for pain   -Main concern is that he needs help at home, requesting PCA services  - Care Coordination Referral  - Home Care Nursing Referral    Spastic diplegic cerebral palsy (H)  - Care Coordination Referral  - Home Care Nursing Referral    Essential hypertension  -at goal       25 minutes spent on the date of the encounter doing chart review, history and exam, documentation and further activities per the note       Return in about 6 months (around 8/2/2022) for Follow up, with me, in person.    Sunita Dumont MD MPH    [facility] Duncan is a 51 year old who presents for the following health issues :    HPI     Family is out      Hospital Follow-up Visit:    Hospital/Nursing Home/IP Rehab Facility: [facility]  Date of Admission: 1/27/22  Date of Discharge: 1/28/22  Reason(s) for Admission: Fall, Closed displaced spiral frature of shaft of R tibia, Open reduction internal fixation, fracture, tibia, using intramedullary araceli      Was your hospitalization related to COVID-19? No   Problems taking medications regularly:  None  Medication changes since discharge: None  Problems adhering to non-medication therapy:  None    Summary of hospitalization:  [facility] discharge summary reviewed  [facility]  Diagnostic Tests/Treatments reviewed.  Follow up needed: none  Other Healthcare Providers Involved in Patient s Care:         Surgical follow-up appointment - 02/10/2022 with surgeoun  Update since discharge: stable.       Post Discharge Medication Reconciliation: discharge medications reconciled, continue medications without change.  Plan of care  "communicated with patient              Requesting PCA services    Ortho follow up on 2/10/22    No constipation  No nausea or emesis  No chest pain or shortness of breath  No bleeding  No fever or chills  Urine normal    The following is a summary from the hospital visit:      \"Admission Date: 1/27/2022  Admission Diagnoses: Spastic diplegic cerebral palsy (H) [G80.1]  Closed displaced spiral fracture of shaft of right tibia, initial encounter [S82.241A]  Fall, initial encounter [W19.XXXA]  Tibia/fibula fracture, right, closed, initial encounter [S82.201A, S82.401A]      Discharge Date:  1/28/2022     Post-operative Day:  1 Day Post-Op     Reason for Admission: The patient was admitted for the following:  Procedure(s):  OPEN REDUCTION INTERNAL FIXATION, FRACTURE, TIBIA, USING INTRAMEDULLARY ZARA        BRIEF HOSPITAL COURSE   This 51 year old male underwent the aforementioned procedure with Dr. Fonseca on 01/27/22. There were no intraoperative complications and the patient was transferred to the recovery room and later the orthopedic unit in stable condition. Once the patient reached the orthopedic floor our orthopedic pain protocol was implemented along with the following:     Anticoagulation Medications: ASA  Therapy: PT and OT  Activity: NWB  Bracing: Bulky Mario Splint  additonal problems for provider to add (Optional):[id number]}    Review of Systems   Constitutional, HEENT, cardiovascular, pulmonary, gi and gu systems are negative, except as otherwise noted.      Objective    /85 (BP Location: Right arm, Patient Position: Sitting, Cuff Size: Adult Regular)   Pulse 86   Temp 97.6  F (36.4  C) (Tympanic)   Ht 1.651 m (5' 5\")   Wt 68 kg (149 lb 14.6 oz)   SpO2 97%   BMI 24.95 kg/m    Body mass index is 24.95 kg/m .  Physical Exam   GENERAL APPEARANCE: healthy, alert and no distress, seated in a wheelchair   EYES: Eyes grossly normal to inspection and conjunctivae and sclerae normal  RESP: lungs clear to " auscultation - no rales, rhonchi or wheezes  CV: regular rates and rhythm, normal S1 S2  ABDOMEN: soft, non-tender and no rebound or guarding   MS: extremities normal- sensation intact, no decrease in temperature, no cyanosis.   SKIN: no suspicious lesions or rashes        No results found for this or any previous visit (from the past 24 hour(s)).

## 2022-02-02 NOTE — TELEPHONE ENCOUNTER
[facility] called declining referral, states don't offer PCA help, they only do skilled Nursing  Benita Higgins  [facility]  2nd Floor  Primary Care

## 2022-02-03 ENCOUNTER — PATIENT OUTREACH (OUTPATIENT)
Dept: NURSING | Facility: CLINIC | Age: 52
End: 2022-02-03
Payer: COMMERCIAL

## 2022-02-03 ENCOUNTER — VIRTUAL VISIT (OUTPATIENT)
Dept: FAMILY MEDICINE | Facility: CLINIC | Age: 52
End: 2022-02-03
Payer: COMMERCIAL

## 2022-02-03 DIAGNOSIS — G80.1 SPASTIC DIPLEGIC CEREBRAL PALSY (H): Chronic | ICD-10-CM

## 2022-02-03 DIAGNOSIS — N30.00 ACUTE CYSTITIS WITHOUT HEMATURIA: Primary | ICD-10-CM

## 2022-02-03 PROCEDURE — 99213 OFFICE O/P EST LOW 20 MIN: CPT | Mod: 95 | Performed by: PHYSICIAN ASSISTANT

## 2022-02-03 RX ORDER — NITROFURANTOIN 25; 75 MG/1; MG/1
100 CAPSULE ORAL 2 TIMES DAILY
Qty: 20 CAPSULE | Refills: 0 | Status: SHIPPED | OUTPATIENT
Start: 2022-02-03 | End: 2022-02-13

## 2022-02-03 ASSESSMENT — PAIN SCALES - GENERAL: PAINLEVEL: EXTREME PAIN (8)

## 2022-02-03 NOTE — PROGRESS NOTES
Dakota is a 51 year old who is being evaluated via a billable video visit.      How would you like to obtain your AVS? MyChart  If the video visit is dropped, the invitation should be resent by: Send to e-mail at: [email or web address]  Will anyone else be joining your video visit? No    Video Start Time: 12:42 PM    Assessment & Plan   Problem List Items Addressed This Visit        Nervous and Auditory    Spastic diplegic cerebral palsy (H) (Chronic)      Other Visit Diagnoses     Acute cystitis without hematuria    -  Primary    Relevant Medications    nitroFURantoin macrocrystal-monohydrate (MACROBID) 100 MG capsule    Other Relevant Orders    UA with Microscopic - lab collect    Urine Culture Aerobic Bacterial - lab collect         Start Nitrofurantoin 100 mg twice a day for 10 days  If not better in 3 days please follow up in person for urine samples.       10 minutes spent on the date of the encounter doing chart review, history and exam, documentation and further activities per the note           Return in about 3 days (around 2/6/2022), or if symptoms worsen or fail to improve.    JOSE A [facility]    Michel Duncan is a 51 year old who presents for the following health issues     HPI     Genitourinary - Male  Onset/Duration: Today  Description:   Dysuria (painful urination): YES  Hematuria (blood in urine): no  Frequency: YES  Waking at night to urinate: YES  Hesitancy (delay in urine): YES  Retention (unable to empty): no  Decrease in urinary flow:   Incontinence: YES  Progression of Symptoms:  same  Accompanying Signs & Symptoms:  Fever: no  Back/Flank pain: no  Urethral discharge: no  Testicle lumps/masses/pain: no  Nausea and/or vomiting: no  Abdominal pain: no  History:   History of frequent UTI s: YES  History of kidney stones: no  History of hernias: no  Personal or Family history of Prostate problems: YES- Grandfather  Sexually active:  no  Precipitating or alleviating factors: None  Therapies tried and outcome: increase fluid intake and Tylenol    Patient is unable to come to the clinic today, but remembers in the past his UTI would start the same way, with urinary odor and dysuria.           Review of Systems   Constitutional, HEENT, cardiovascular, pulmonary, gi and gu systems are negative, except as otherwise noted.      Objective    Vitals - Patient Reported  Pain Score: Extreme Pain (8)  Pain Loc: Abdomen (Legs)      Vitals:  No vitals were obtained today due to virtual visit.    Physical Exam   GENERAL: Healthy, alert and no distress  EYES: Eyes grossly normal to inspection.  No discharge or erythema, or obvious scleral/conjunctival abnormalities.  RESP: No audible wheeze, cough, or visible cyanosis.  No visible retractions or increased work of breathing.    SKIN: Visible skin clear. No significant rash, abnormal pigmentation or lesions.  NEURO: Cranial nerves grossly intact.  Mentation and speech appropriate for age.  PSYCH: Mentation appears normal, affect normal/bright, judgement and insight intact, normal speech and appearance well-groomed.                Video-Visit Details    Type of service:  Video Visit    Video End Time:12:51 PM    Originating Location (pt. Location): Home    Distant Location (provider location):  [facility]     Platform used for Video Visit: "[employer]

## 2022-02-03 NOTE — PROGRESS NOTES
Clinic Care Coordination Contact  Community Health Worker Initial Outreach    CHW Initial Information Gathering:  Referral Source: PCP  CHW Additional Questions  If ED/Hospital discharge, follow-up appointment scheduled as recommended?: N/A  Medication changes made following ED/Hospital discharge?: N/A  MyChart active?: Yes  Patient sent Social Determinants of Health questionnaire?: No    Patient accepts CC: Yes. Patient scheduled for assessment with CC SW on 02/08/22 at 2:00 pm . Patient noted desire to discuss Patient i would PCA resources. he needs help with meals, bathing  etc.   Resources for Support  Requesting PCA services

## 2022-02-08 ENCOUNTER — PATIENT OUTREACH (OUTPATIENT)
Dept: NURSING | Facility: CLINIC | Age: 52
End: 2022-02-08
Payer: COMMERCIAL

## 2022-02-08 SDOH — ECONOMIC STABILITY: TRANSPORTATION INSECURITY
IN THE PAST 12 MONTHS, HAS THE LACK OF TRANSPORTATION KEPT YOU FROM MEDICAL APPOINTMENTS OR FROM GETTING MEDICATIONS?: NO

## 2022-02-08 SDOH — ECONOMIC STABILITY: FOOD INSECURITY: WITHIN THE PAST 12 MONTHS, YOU WORRIED THAT YOUR FOOD WOULD RUN OUT BEFORE YOU GOT MONEY TO BUY MORE.: NEVER TRUE

## 2022-02-08 SDOH — ECONOMIC STABILITY: FOOD INSECURITY: WITHIN THE PAST 12 MONTHS, THE FOOD YOU BOUGHT JUST DIDN'T LAST AND YOU DIDN'T HAVE MONEY TO GET MORE.: NEVER TRUE

## 2022-02-08 SDOH — ECONOMIC STABILITY: TRANSPORTATION INSECURITY
IN THE PAST 12 MONTHS, HAS LACK OF TRANSPORTATION KEPT YOU FROM MEETINGS, WORK, OR FROM GETTING THINGS NEEDED FOR DAILY LIVING?: NO

## 2022-02-08 ASSESSMENT — ACTIVITIES OF DAILY LIVING (ADL): DEPENDENT_IADLS:: MEAL PREPARATION;CLEANING;COOKING;LAUNDRY;SHOPPING;TRANSPORTATION

## 2022-02-08 ASSESSMENT — SOCIAL DETERMINANTS OF HEALTH (SDOH): HOW HARD IS IT FOR YOU TO PAY FOR THE VERY BASICS LIKE FOOD, HOUSING, MEDICAL CARE, AND HEATING?: NOT HARD AT ALL

## 2022-02-08 NOTE — LETTER
[facility]  Patient Centered Plan of Care  About Me:        Patient Name:  Dakota Wilkinson    YOB: 1970  Age:         51 year old   [place]era MRN:    [id number] Telephone Information:  Home Phone [phone]   Mobile [phone]       Address:  [address]  [facility] [place] [facility] [address] Email address:  [email or web address]      Emergency Contact(s)    Name Relationship Lgl Grd Work Phone Home Phone Mobile Phone   [employer] Mother   [phone]           Primary language:  English     needed? No   [place] [employer]:  [phone] op. 1  Other communication barriers:Physical impairment    Preferred Method of Communication:     Current living arrangement: I live in a private home with family    Mobility Status/ Medical Equipment: Independent w/Device        Health Maintenance  Health Maintenance Reviewed: Due/Overdue   Health Maintenance Due   Topic Date Due     PREVENTIVE CARE VISIT  Never done     ADVANCE CARE PLANNING  Never done     COLORECTAL CANCER SCREENING  Never done     HIV SCREENING  Never done     HEPATITIS C SCREENING  Never done     LIPID  Never done     ZOSTER IMMUNIZATION (1 of 2) 06/13/2021           My Access Plan  Medical Emergency 911   Primary Clinic Line [facility] - [phone]   24 Hour Appointment Line [phone] or  [phone]) (toll-free)   24 Hour Nurse Line [phone] (toll-free)   Preferred Urgent Care [facility] [phone]     Preferred Hospital No data recorded   Preferred Pharmacy [facility] [place], [place] - [address] [address]     Behavioral Health Crisis Line The National Suicide Prevention Lifeline at 1-902.191.4379 or 911             My Care Team Members  Patient Care Team       Relationship Specialty Notifications Start End    Sunita Dumont MD PCP - General Family Practice  10/14/19     Phone: [phone]  Fax: [phone]         [address] [place] [facility] [address]    Sunita Dumont MD Assigned PCP   9/20/19     Phone: [phone] Fax: [phone]         [address] [place] [facility] [address]    Anatoliy Heredia PsyD Assigned Sleep Provider   11/21/21     Phone: [phone] [address] [facility] [address]    Andres Lieberman BSW Lead Care Coordinator Primary Care - CC Admissions 2/8/22     Sri Parikh MA Community Health Worker Primary Care - CC Admissions 2/8/22             My Care Plans  Self Management and Treatment Plan  Goals and (Comments)  Goals        General     Financial Wellbeing (pt-stated)      Notes - Note created  2/8/2022  2:35 PM by Andres Lieberman BSW     Goal Statement: I will apply for health insurance within the next 1-2 weeks if I am eligible.   Date Goal Set:  2/8/2022  Strengths:  Patient is a great self advocate; Patient is enrolled in Care Coordination; Patient is willing to work with FRW  Barriers: None  Date to Achieve By: 5/2022  Patient expressed understanding of goal: Yes  Action steps to achieve this goal:  1. I understand a referral was placed to the Financial Resource Worker, I will receive a call within the next 3 business days.    2. I understand the financial worker will make two attempts to call me. If I still need help with this goal, I will connect with my Community Health Worker Sri at [phone].               Psychosocial (pt-stated)      Notes - Note created  2/8/2022  2:41 PM by Andres Lieberman BSW     Goal Statement: I would like to sign up for Medicare Part B  Date Goal set: 2/8/2022  Barriers: Missed open enrollment period  Strengths: Patient is a great self advocate; Patient is enrolled in Care Coordination  Date to Achieve By: 10/2022  Patient expressed understanding of goal: Yes  Action steps to achieve this goal:  1. I will contact Medicare to see if anything can be done to enroll in part B  2. I will enroll in part B when open  enrollment starts again            Transportation (pt-stated)      Notes - Note created  2/8/2022  2:39 PM by Andres Lieberman BSW     Goal Statement: I need wheelchair transportation while healing from surgery  Date Goal set: 2/8/2022  Barriers: Family members are periodically unavailable to provide transportation  Strengths: Patient is a great self advocate; Patient is enrolled in Care Coordination  Date to Achieve By: 5/2022  Patient expressed understanding of goal: Yes  Action steps to achieve this goal:  1. I will contact the resources sent to me by [facility]  2. I will utilize wheelchair transportation company of choice                 Action Plans on File:                       Advance Care Plans/Directives Type:   No data recorded    My Medical and Care Information  Problem List   Patient Active Problem List   Diagnosis     Spastic diplegic cerebral palsy (H)     Essential hypertension     Migraines     Excessive sweating     Chronic insomnia     Periodic limb movement disorder (PLMD)     Closed displaced spiral fracture of shaft of right tibia, initial encounter     Personal history of traumatic fracture     Ataxia     History of COVID-19      Current Medications and Allergies:  See printed Medication Report.    Care Coordination Start Date: 2/8/2022   Frequency of Care Coordination: monthly     Form Last Updated: 02/08/2022

## 2022-02-08 NOTE — PROGRESS NOTES
Clinic Care Coordination Contact    Clinic Care Coordination Contact  OUTREACH    Referral Information:  Referral Source: PCP    Reason for Referral: Patient/Caregiver Support   Patient/Caregiver Suport: Resources for Support   Clinical Staff have discussed the Care Coordination Referral with the patient and/or caregiver: Yes   Additional Information: Requesting PCA services         Primary Diagnosis: Psychosocial    Chief Complaint   Patient presents with     Clinic Care Coordination - Initial     Andres LiebermanThomas SW        Berry Utilization: [facility] Utilization  Difficulty keeping appointments:: No  Compliance Concerns: No  No-Show Concerns: No  No PCP office visit in Past Year: No  Utilization    Hospital Admissions  1             ED Visits  1             No Show Count (past year)  0                Current as of: 2/8/2022  1:33 AM              Clinical Concerns:  Current Medical Concerns:    Patient Active Problem List   Diagnosis     Spastic diplegic cerebral palsy (H)     Essential hypertension     Migraines     Excessive sweating     Chronic insomnia     Periodic limb movement disorder (PLMD)     Closed displaced spiral fracture of shaft of right tibia, initial encounter     Personal history of traumatic fracture     Ataxia     History of COVID-19         Current Behavioral Concerns: None    Education Provided to patient: Introduced self and role   Pain  Pain (GOAL):: No  Health Maintenance Reviewed: Due/Overdue   Health Maintenance Due   Topic Date Due     PREVENTIVE CARE VISIT  Never done     ADVANCE CARE PLANNING  Never done     COLORECTAL CANCER SCREENING  Never done     HIV SCREENING  Never done     HEPATITIS C SCREENING  Never done     LIPID  Never done     ZOSTER IMMUNIZATION (1 of 2) 06/13/2021       Clinical Pathway: None    Medication Management:  Medication review status: Medications reviewed and no changes reported per patient.        Medications reviewed by  clinic staff on 2/3/2022     Functional Status:  Dependent ADLs:: Wheelchair-independent  Dependent IADLs:: Meal Preparation,Cleaning,Cooking,Laundry,Shopping,Transportation  Bed or wheelchair confined:: No  Mobility Status: Independent w/Device  Fallen 2 or more times in the past year?: Yes  Any fall with injury in the past year?: Yes    Living Situation:  Current living arrangement:: I live in a private home with family  Type of residence:: Private home - stairs    Lifestyle & Psychosocial Needs: CC contacted patient. Introduced self and role. Discussed consult reason. Patient stated that he recently had surgery and he is needing help with some ADLs while he recovers. He will also need to start therapy, but will find out more at his follow up appointment on Thursday. Discussed the potential to have home therapies come in if provider is in agreement at his appointment. CC stated that he could then request to have a HHA added to the services, which could help with ADLs. Discussed transportation. Patient stated that friends and family generally can help him, but there are times they are unable to transport him. He would like resources for when he is needing the help. For now, he needs wheelchair transportation, but as his recovery progresses, he will be able to use standard transportation. [facility] offered to send wheelchair transportation options via [employer] to get him started and can send more standard transportation options as he recovers. Patient agreeable. Patient also had questions regarding Medicare part B. Patient does not have a Medicare advantage plan. CC reviewed open enrollment and it appears he missed the window (October 15th to December 7th). [facility] offered to send the Medicare number so he can call and ask more questions, if needed. Patient agreeable. Will include this in [employer] message. Last portion of the discussion was regarding insurance and his potential eligibility for MA. CC reviewed FRW  questions. Patient stated that the only income he has is from his Social Security Disability. He stated that he gets around $256/month and pays his Ucare premium with that. He lives with his mom and stepdad, so he doesn't have much for assets. [facility] stated that she can refer him to [facility] to apply for MA. Patient agreeable.     Social Determinants of Health     Tobacco Use: Medium Risk     Smoking Tobacco Use: Former Smoker     Smokeless Tobacco Use: Never Used   Alcohol Use: Not on file   Financial Resource Strain: Low Risk      Difficulty of Paying Living Expenses: Not hard at all   Food Insecurity: No Food Insecurity     Worried About Running Out of Food in the Last Year: Never true     Ran Out of Food in the Last Year: Never true   Transportation Needs: No Transportation Needs     Lack of Transportation (Medical): No     Lack of Transportation (Non-Medical): No   Physical Activity: Not on file   Stress: Not on file   Social Connections: Not on file   Intimate Partner Violence: Not on file   Depression: Not at risk     PHQ-2 Score: 0   Housing Stability: Not on file     Diet:: Regular  Inadequate nutrition (GOAL):: No  Tube Feeding: No  Inadequate activity/exercise (GOAL):: No  Significant changes in sleep pattern (GOAL): No  Transportation means:: Family,Friend     Mandaen or spiritual beliefs that impact treatment:: No  Mental health DX:: No  Mental health management concern (GOAL):: No  Chemical Dependency Status: No Current Concerns      Resources and Interventions:  Current Resources:         Supplies used at home:: None  Equipment Currently Used at Home: wheelchair, manual,walker, standard,grab bar, tub/shower,shower chair  Employment Status: disabled         Advance Care Plan/Directive  Advanced Care Plans/Directives on file:: No    Referrals Placed: None     Goals:   Goals        General     Financial Wellbeing (pt-stated)      Notes - Note created  2/8/2022  2:35 PM by Andres Lieberman BSW     Goal  Statement: I will apply for health insurance within the next 1-2 weeks if I am eligible.   Date Goal Set:  2/8/2022  Strengths:  Patient is a great self advocate; Patient is enrolled in Care Coordination; Patient is willing to work with FRW  Barriers: None  Date to Achieve By: 5/2022  Patient expressed understanding of goal: Yes  Action steps to achieve this goal:  1. I understand a referral was placed to the Financial Resource Worker, I will receive a call within the next 3 business days.    2. I understand the financial worker will make two attempts to call me. If I still need help with this goal, I will connect with my Community Health Worker Sri at [phone].               Psychosocial (pt-stated)      Notes - Note created  2/8/2022  2:41 PM by Andres Lieberman BSW     Goal Statement: I would like to sign up for Medicare Part B  Date Goal set: 2/8/2022  Barriers: Missed open enrollment period  Strengths: Patient is a great self advocate; Patient is enrolled in Care Coordination  Date to Achieve By: 10/2022  Patient expressed understanding of goal: Yes  Action steps to achieve this goal:  1. I will contact Medicare to see if anything can be done to enroll in part B  2. I will enroll in part B when open enrollment starts again            Transportation (pt-stated)      Notes - Note created  2/8/2022  2:39 PM by Andres Lieberman BSW     Goal Statement: I need wheelchair transportation while healing from surgery  Date Goal set: 2/8/2022  Barriers: Family members are periodically unavailable to provide transportation  Strengths: Patient is a great self advocate; Patient is enrolled in Care Coordination  Date to Achieve By: 5/2022  Patient expressed understanding of goal: Yes  Action steps to achieve this goal:  1. I will contact the resources sent to me by [facility]  2. I will utilize wheelchair transportation company of choice                Patient/Caregiver understanding: Yes    Outreach Frequency:  monthly      Plan: Patient was enrolled in Care Coordination. Patient will work with FRW on applying for MA. Patient will contact transportation resources sent to him via [employer]. CC will send intro letter and patient centered plan of care via [employer] as well. CHW will contact patient in one month for follow up. SWCC will review chart in 6 weeks, per standard workflow.    Financial Resource Worker Screening    [facility] Benefits  Is patient requesting help applying for [facility] benefits?: No    Insurance:  [place] No  Is this an insurance renewal?: No  Is this a new insurance application request?: Yes  Have you recently applied for insurance?: No  How many people in your household? : 3  Do you file taxes?: Yes  How many dependents do you claim?: 0  What is the monthly gross income for the household (wages, social security, workers comp, and pension)? : 256 (Social Security Disability Income)    Any other information for the FRW?: Patient wants to apply for Medical Assistance. Currently has Medicare part A and Ucare. Paying Ucare premium out of pocket. Lives with mother and step-father.    Care Coordination team will tell patient:   Thank you for answering all the questions, based on screening questions, our Financial Resource Worker will reach out to you with additional questions and next steps.    Andres Lieberman, DOREEN  Primary Care Clinic- Social Work Care Coordinator  [facility]  Ph: [phone]  2/8/2022 3:09 PM

## 2022-02-08 NOTE — LETTER
[facility] CARE COORDINATION  [address]  [place] [address]    February 8, 2022    Dakota Wilkinson  [address]  [place] [address]      Dear Dakota,    I am a clinic care coordinator who works with Suntia Dumont MD at [facility]. I wanted to thank you for spending the time to talk with me.  Below is a description of clinic care coordination and how I can further assist you.      The clinic care coordination team is made up of a registered nurse,  and community health worker who understand the health care system. The goal of clinic care coordination is to help you manage your health and improve access to the health care system in the most efficient manner. The team can assist you in meeting your health care goals by providing education, coordinating services, strengthening the communication among your providers and supporting you with any resource needs.    Please feel free to contact the Community Health Worker Sri at [phone] with any questions or concerns. We are focused on providing you with the highest-quality healthcare experience possible and that all starts with you.     Sincerely,     Andres Lieberman, MEHREEN  Primary Care Clinic- Social Work Care Coordinator  [facility]  Ph: [phone]    Enclosed: I have enclosed a copy of the Patient Centered Plan of Care. This has helpful information and goals that we have talked about. Please keep this in an easy to access place to use as needed.

## 2022-02-09 ENCOUNTER — PATIENT OUTREACH (OUTPATIENT)
Dept: CARE COORDINATION | Facility: CLINIC | Age: 52
End: 2022-02-09
Payer: COMMERCIAL

## 2022-02-09 NOTE — PROGRESS NOTES
Clinic Care Coordination Contact  Program: Health Insurance (Certain Pop)   County: [facility] Case #:  [facility] Worker:   Subscriber #:   Renewal:  Date Applied:     FRW Outreach:  2/10/2022: FRW received a vm from patient. FRW called patient back and left a vm with call back information. Patient called FRW again, FRW called back and patient did not answer. FRW will call patient next week. Patient called FRW back and we went through the Certain Pop screening. Patient appears to be within the guidelines. We scheduled a phone appointment for 22 at 10:30 am. FRW will make outreach at that time.   2022: Outreach attempted x 1. Left message on [employer]baconil with call back information and requested return call.  Plan: FRW will call again within one week.       Certain Population Application Screenin. Do you currently have health insurance? UCare Ind through [employer]  2. How many people in household? 1  3. Do you file taxes, who do you file with? Not required to   4. What is your monthly income (include all tax members)? Soc Sec $256  5. Do you have a bank account? 1 checking   6. Do you have social security cards and/or green cards?  [place] Citizen      Health Insurance:    UCare Individual     Referral/Screening:    [facility] Benefits  Is patient requesting help applying for [facility] benefits?: No     Ins[place]:  [place] No  Is this an insurance renewal?: No  Is this a new insurance application request?: Yes  Have you recently applied for insurance?: No  How many people in your household? : 3  Do you file taxes?: Yes  How many dependents do you claim?: 0  What is the monthly gross income for the household (wages, social security, workers comp, and pension)? : 256 (Social Security Disability Income)     Any other information for the FRW?: Patient wants to apply for Medical Assistance. Currently has Medicare part A and Ucare. Paying Ucare premium out of pocket. Lives with mother and  step-father.     Care Coordination team will tell patient:   Thank you for answering all the questions, based on screening questions, our Financial Resource Worker will reach out to you with additional questions and next steps.     DOREEN Boyce  Primary Care Clinic- Social Work Care Coordinator  [facility]  Ph: [phone]  2/8/2022 3:09 PM    Financial Resource Worker Follow Up    Goals:   Goals Addressed as of 2/10/2022 at 9:29 AM                    2/9/22       Financial Wellbeing (pt-stated)   20%    Added 2/8/22 by Andres Lieberman, AVANI      Goal Statement: I will apply for health insurance within the next 1-2 weeks if I am eligible.   Date Goal Set:  2/8/2022  Strengths:  Patient is a great self advocate; Patient is enrolled in Care Coordination; Patient is willing to work with FRW  Barriers: None  Date to Achieve By: 5/2022  Patient expressed understanding of goal: Yes  Action steps to achieve this goal:  1. I understand a referral was placed to the Financial Resource Worker, I will receive a call within the next 3 business days.    2. I understand the financial worker will make two attempts to call me. If I still need help with this goal, I will connect with my Community Health Worker Sri at [phone].                    Intervention and Education during outreach: n/a    FRW Plan: FRW will make outreach 2/17/22 at 10:30 am

## 2022-02-13 ENCOUNTER — HEALTH MAINTENANCE LETTER (OUTPATIENT)
Age: 52
End: 2022-02-13

## 2022-02-17 ENCOUNTER — PATIENT OUTREACH (OUTPATIENT)
Dept: CARE COORDINATION | Facility: CLINIC | Age: 52
End: 2022-02-17
Payer: COMMERCIAL

## 2022-02-17 ENCOUNTER — APPOINTMENT (OUTPATIENT)
Dept: CARE COORDINATION | Facility: CLINIC | Age: 52
End: 2022-02-17
Payer: COMMERCIAL

## 2022-02-17 NOTE — PROGRESS NOTES
Clinic Care Coordination Contact  Program: Health Insurance (Certain Pop)   County: [facility] Case #:  [facility] Worker:   Subscriber #:   Renewal:  Date Applied: 2022    FRW Outreach:  2022: FRW called patient and we completed the Certain Population application together over the phone. FRW will mail to patient along with the AVS form and SY[place]IA to sign, date and mail to the county on his own. FRW will follow up in 2 weeks to make sure patient receives the forms in the mail.   2/10/2022: FRW received a vm from patient. FRW called patient back and left a vm with call back information. Patient called FRW again, FRW called back and patient did not answer. FRW will call patient next week. Patient called FRW back and we went through the Certain Pop screening. Patient appears to be within the guidelines. We scheduled a phone appointment for 22 at 10:30 am. FRW will make outreach at that time.   2022: Outreach attempted x 1. Left message on [employer] with call back information and requested return call.  Plan: FRW will call again within one week.       Certain Population Application Screenin. Do you currently have health insurance? [employer] Ind through [employer]  2. How many people in household? 1  3. Do you file taxes, who do you file with? Not required to   4. What is your monthly income (include all tax members)? Soc Sec $256  5. Do you have a bank account? 1 checking   6. Do you have social security cards and/or green cards?  [place] Citizen      Health Insurance:    [facility] Individual     Referral/Screening:    [facility] Benefits  Is patient requesting help applying for [facility] benefits?: No     [place]urance:  [place] No  Is this an insurance renewal?: No  Is this a new insurance application request?: Yes  Have you recently applied for insurance?: No  How many people in your household? : 3  Do you file taxes?: Yes  How many dependents do you claim?: 0  What is the monthly  gross income for the household (wages, social security, workers comp, and pension)? : 256 (Social Security Disability Income)     Any other information for the FRW?: Patient wants to apply for Medical Assistance. Currently has Medicare part A and Ucare. Paying Ucare premium out of pocket. Lives with mother and step-father.     Care Coordination team will tell patient:   Thank you for answering all the questions, based on screening questions, our Financial Resource Worker will reach out to you with additional questions and next steps.     DOREEN Boyce  Primary Care Clinic- Social Work Care Coordinator  [facility]  Ph: [phone]  2/8/2022 3:09 PM    Financial Resource Worker Follow Up    Goals:   Goals Addressed as of 2/17/2022 at 10:54 AM                    Today    2/9/22       Financial Wellbeing (pt-stated)   60%  20%    Added 2/8/22 by Andres Lieberman, BSMEHREEN      Goal Statement: I will apply for health insurance within the next 1-2 weeks if I am eligible.   Date Goal Set:  2/8/2022  Strengths:  Patient is a great self advocate; Patient is enrolled in Care Coordination; Patient is willing to work with FRW  Barriers: None  Date to Achieve By: 5/2022  Patient expressed understanding of goal: Yes  Action steps to achieve this goal:  1. I understand a referral was placed to the Financial Resource Worker, I will receive a call within the next 3 business days.    2. I understand the financial worker will make two attempts to call me. If I still need help with this goal, I will connect with my Community Health Worker Sri at [phone].                    Intervention and Education during outreach: n/a    FRW Plan: FRW will follow up in 2 weeks

## 2022-03-03 ENCOUNTER — PATIENT OUTREACH (OUTPATIENT)
Dept: CARE COORDINATION | Facility: CLINIC | Age: 52
End: 2022-03-03
Payer: COMMERCIAL

## 2022-03-03 NOTE — PROGRESS NOTES
Clinic Care Coordination Contact  Program: Health Insurance (Certain Pop)   County: [facility] Case #:  [facility] Worker:   Subscriber #:   Renewal:  Date Applied: 2022    FRW Outreach:  3/3/2022: FRW mailed the applications and forms to patient on 22. FRW called patient and left a vm with call back information. FRW will call patient in 1 week.   2022: FRW called patient and we completed the Certain Population application together over the phone. FRW will mail to patient along with the AVS form and [place] to sign, date and mail to the county on his own. FRW will follow up in 2 weeks to make sure patient receives the forms in the mail.   2/10/2022: FRW received a vm from patient. FRW called patient back and left a vm with call back information. Patient called FRW again, FRW called back and patient did not answer. FRW will call patient next week. Patient called FRW back and we went through the Certain Pop screening. Patient appears to be within the guidelines. We scheduled a phone appointment for 22 at 10:30 am. FRW will make outreach at that time.   2022: Outreach attempted x 1. Left message on voicemail with call back information and requested return call.  Plan: FRW will call again within one week.       Certain Population Application Screenin. Do you currently have health insurance? [employer]Care Ind through [employer]  2. How many people in household? 1  3. Do you file taxes, who do you file with? Not required to   4. What is your monthly income (include all tax members)? Soc Sec $256  5. Do you have a bank account? 1 checking   6. Do you have social security cards and/or green cards?  [place] Citizen      Health Insurance:    UCare Individual     Referral/Screening:    [facility] Benefits  Is patient requesting help applying for [facility] benefits?: No     Insurance:  [place] No  Is this an insurance renewal?: No  Is this a new insurance application request?: Yes  Have  you recently applied for insurance?: No  How many people in your household? : 3  Do you file taxes?: Yes  How many dependents do you claim?: 0  What is the monthly gross income for the household (wages, social security, workers comp, and pension)? : 256 (Social Security Disability Income)     Any other information for the FRW?: Patient wants to apply for Medical Assistance. Currently has Medicare part A and Ucare. Paying Ucare premium out of pocket. Lives with mother and step-father.     Care Coordination team will tell patient:   Thank you for answering all the questions, based on screening questions, our Financial Resource Worker will reach out to you with additional questions and next steps.     Andres Lieberman, DOREEN  Primary Care Clinic- Social Work Care Coordinator  [facility] and [facility]  Ph: [phone]  2/8/2022 3:09 PM    Financial Resource Worker Follow Up    Goals:   Goals Addressed as of 2/17/2022 at 10:54 AM                    Today    2/9/22       Financial Wellbeing (pt-stated)   60%  20%    Added 2/8/22 by Andres Lieberman, BSW      Goal Statement: I will apply for health insurance within the next 1-2 weeks if I am eligible.   Date Goal Set:  2/8/2022  Strengths:  Patient is a great self advocate; Patient is enrolled in Care Coordination; Patient is willing to work with FRW  Barriers: None  Date to Achieve By: 5/2022  Patient expressed understanding of goal: Yes  Action steps to achieve this goal:  1. I understand a referral was placed to the Financial Resource Worker, I will receive a call within the next 3 business days.    2. I understand the financial worker will make two attempts to call me. If I still need help with this goal, I will connect with my Community Health Worker Sri at [phone].                    Intervention and Education during outreach: n/a    FRW Plan: FRW will follow up in 1 week

## 2022-03-10 ENCOUNTER — PATIENT OUTREACH (OUTPATIENT)
Dept: CARE COORDINATION | Facility: CLINIC | Age: 52
End: 2022-03-10
Payer: COMMERCIAL

## 2022-03-22 ENCOUNTER — PATIENT OUTREACH (OUTPATIENT)
Dept: NURSING | Facility: CLINIC | Age: 52
End: 2022-03-22
Payer: COMMERCIAL

## 2022-03-22 NOTE — PROGRESS NOTES
Clinic Care Coordination Contact    Follow Up Progress Note      Assessment: [facility] contacted patient this afternoon. He confirmed he has been working with [facility] on completing applications for the [facility]. Patient did mention something about not being eligible, but doesn't appear FRW has been able to reach patient to be able to speak to him about this. Patient stated that he received the transportation resources sent by [facility], but doesn't feel he will need to use them at this time. Patient also stated that he hasn't had a chance to call Medicare about Medicare Part B yet. He plans to do this soon.    Care Gaps:    Health Maintenance Due   Topic Date Due     PREVENTIVE CARE VISIT  Never done     ADVANCE CARE PLANNING  Never done     COLORECTAL CANCER SCREENING  Never done     HIV SCREENING  Never done     HEPATITIS C SCREENING  Never done     LIPID  Never done     ZOSTER IMMUNIZATION (1 of 2) 06/13/2021       Care Gaps not addressed during this encounter d/t focus on psychosocial needs. Will address once psychosocial needs are met      Goals addressed this encounter:   Goals Addressed                    This Visit's Progress       Patient Stated       Financial Wellbeing (pt-stated)   60%      Goal Statement: I will apply for health insurance within the next 1-2 weeks if I am eligible.   Date Goal Set:  2/8/2022  Strengths:  Patient is a great self advocate; Patient is enrolled in Care Coordination; Patient is willing to work with FRW  Barriers: None  Date to Achieve By: 5/2022  Patient expressed understanding of goal: Yes  Action steps to achieve this goal:  1. I understand a referral was placed to the Financial Resource Worker, I will receive a call within the next 3 business days.    2. I understand the financial worker will make two attempts to call me. If I still need help with this goal, I will connect with my Community Health Worker Sri at [phone].      Discussed 3/22/22           Psychosocial (pt-stated)    20%      Goal Statement: I would like to sign up for Medicare Part B  Date Goal set: 2/8/2022  Barriers: Missed open enrollment period  Strengths: Patient is a great self advocate; Patient is enrolled in Care Coordination  Date to Achieve By: 10/2022  Patient expressed understanding of goal: Yes  Action steps to achieve this goal:  1. I will contact Medicare to see if anything can be done to enroll in part B  2. I will enroll in part B when open enrollment starts again     Discussed 3/22/21           Transportation (pt-stated)   0%      Goal Statement: I need wheelchair transportation while healing from surgery  Date Goal set: 2/8/2022  Barriers: Family members are periodically unavailable to provide transportation  Strengths: Patient is a great self advocate; Patient is enrolled in Care Coordination  Date to Achieve By: 5/2022  Patient expressed understanding of goal: Yes  Action steps to achieve this goal:  1. I will contact the resources sent to me by [facility]  2. I will utilize wheelchair transportation company of choice    Discussed 3/22/22            Intervention/Education provided during outreach: Open ended questions     Outreach Frequency: monthly    Plan:   CHW will call patient in one month. SWCC will review chart in 6 weeks, per standard workflow.     DOREEN Boyce  Primary Care Clinic- Social Work Care Coordinator  [facility] and [facility]  Ph: [phone]  3/22/2022 4:00 PM     100.9

## 2022-03-22 NOTE — PROGRESS NOTES
Community Health Worker Follow Up    Care Gaps:     Health Maintenance Due   Topic Date Due     PREVENTIVE CARE VISIT  Never done     ADVANCE CARE PLANNING  Never done     COLORECTAL CANCER SCREENING  Never done     HIV SCREENING  Never done     HEPATITIS C SCREENING  Never done     LIPID  Never done     ZOSTER IMMUNIZATION (1 of 2) 06/13/2021       Patient will address at next PCP visit.     Goals:   Goals Addressed as of 3/22/2022 at 3:44 PM                    Today    2/17/22       Financial Wellbeing (pt-stated)     60%    Added 2/8/22 by Andres Lieberman BSW      Goal Statement: I will apply for health insurance within the next 1-2 weeks if I am eligible.   Date Goal Set:  2/8/2022  Strengths:  Patient is a great self advocate; Patient is enrolled in Care Coordination; Patient is willing to work with FRW  Barriers: None  Date to Achieve By: 5/2022  Patient expressed understanding of goal: Yes  Action steps to achieve this goal:  1. I understand a referral was placed to the Financial Resource Worker, I will receive a call within the next 3 business days.    2. I understand the financial worker will make two attempts to call me. If I still need help with this goal, I will connect with my Community Health Worker Sri at [phone].      Discussed 3/22/22           Psychosocial (pt-stated)   20%      Added 2/8/22 by Andres Lieberman BSW      Goal Statement: I would like to sign up for Medicare Part B  Date Goal set: 2/8/2022  Barriers: Missed open enrollment period  Strengths: Patient is a great self advocate; Patient is enrolled in Care Coordination  Date to Achieve By: 10/2022  Patient expressed understanding of goal: Yes  Action steps to achieve this goal:  1. I will contact Medicare to see if anything can be done to enroll in part B  2. I will enroll in part B when open enrollment starts again     Discussed 3/22/21           Transportation (pt-stated)   20%      Added 2/8/22 by Andres Lieberman BSW      Goal  Statement: I need wheelchair transportation while healing from surgery  Date Goal set: 2/8/2022  Barriers: Family members are periodically unavailable to provide transportation  Strengths: Patient is a great self advocate; Patient is enrolled in Care Coordination  Date to Achieve By: 5/2022  Patient expressed understanding of goal: Yes  Action steps to achieve this goal:  1. I will contact the resources sent to me by [facility]  2. I will utilize wheelchair transportation company of choice    Discussed 3/22/22            Intervention and Education during outreach: Patient states that he completed the certain populations application and he did not qualify for assistance.   He is still working with the FRW and needs Medicare Part B.     He would like to discontinue his transportation goal. He is able to get rides where he needs to go or find transportation through other resources.    CHW Plan: CHW will continue to support patient with goals through routine scheduled outreach.     Next outreach due: 4/19/22

## 2022-03-24 ENCOUNTER — PATIENT OUTREACH (OUTPATIENT)
Dept: CARE COORDINATION | Facility: CLINIC | Age: 52
End: 2022-03-24
Payer: COMMERCIAL

## 2022-03-24 NOTE — PROGRESS NOTES
Clinic Care Coordination Contact  Program: Health Insurance (Certain Pop)   County: [facility] Case #:  [facility] Worker:   Subscriber #:   Renewal:  Date Applied: 2022    FRW Outreach:  3/24/2022: FRW called patient and he sates he does not want to apply for [place] at this time. Patient states he would like to get Medicare Part B. [facility] FRW will resolve the FRW episode and remove patient from panel. Please refer to FRW for future needs.   3/10/2022: FRW called patient and left a vm with call back information. FRW will make one more outreach in 2 weeks.   3/3/2022: FRW mailed the applications and forms to patient on 22. FRW called patient and left a vm with call back information. FRW will call patient in 1 week.   2022: FRW called patient and we completed the Certain Population application together over the phone. FRW will mail to patient along with the AVS form and SYLVIA to sign, date and mail to the [facility] on his own. FRW will follow up in 2 weeks to make sure patient receives the forms in the mail.   2/10/2022: FRW received a vm from patient. FRW called patient back and left a vm with call back information. Patient called FRW again, FRW called back and patient did not answer. FRW will call patient next week. Patient called FRW back and we went through the Certain Pop screening. Patient appears to be within the guidelines. We scheduled a phone appointment for 22 at 10:30 am. FRW will make outreach at that time.   2022: Outreach attempted x 1. Left message on voicemail with call back information and requested return call.  Plan: FRW will call again within one week.       Certain Population Application Screenin. Do you currently have health insurance? [employer] Ind through [employer]  2. How many people in household? 1  3. Do you file taxes, who do you file with? Not required to   4. What is your monthly income (include all tax members)? Soc Sec  $256  5. Do you have a bank account? 1 checking   6. Do you have social security cards and/or green cards?  [place] Citizen      Health Insurance:    UCare Individual     Referral/Screening:    [facility] Benefits  Is patient requesting help applying for [facility] benefits?: No     Insurance:  [place] No  Is this an insurance renewal?: No  Is this a new insurance application request?: Yes  Have you recently applied for insurance?: No  How many people in your household? : 3  Do you file taxes?: Yes  How many dependents do you claim?: 0  What is the monthly gross income for the household (wages, social security, workers comp, and pension)? : 256 (Social Security Disability Income)     Any other information for the FRW?: Patient wants to apply for Medical Assistance. Currently has Medicare part A and Ucare. Paying Ucare premium out of pocket. Lives with mother and step-father.     Care Coordination team will tell patient:   Thank you for answering all the questions, based on screening questions, our Financial Resource Worker will reach out to you with additional questions and next steps.     DOREEN Boyce  Primary Care Clinic- Social Work Care Coordinator  [facility] and [facility]  Ph: [phone]  2/8/2022 3:09 PM    Financial Resource Worker Follow Up    Goals:       Intervention and Education during outreach: n/a    FRW Plan: n/a

## 2022-04-26 ENCOUNTER — PATIENT OUTREACH (OUTPATIENT)
Dept: CARE COORDINATION | Facility: CLINIC | Age: 52
End: 2022-04-26
Payer: COMMERCIAL

## 2022-04-26 NOTE — PROGRESS NOTES
[facility] [[email or web address]]/Voicemail       Clinical Data: Care Coordinator Outreach  Outreach attempted x 1.  Left message on patient's voicemail with call back information and requested return call.  Plan: Care Coordinator will try to reach patient again in 3-5 business days.    Next outreach due: 5/3/22

## 2022-05-06 ENCOUNTER — PATIENT OUTREACH (OUTPATIENT)
Dept: CARE COORDINATION | Facility: CLINIC | Age: 52
End: 2022-05-06
Payer: COMMERCIAL

## 2022-05-06 NOTE — PROGRESS NOTES
Clinic Care Coordination Contact    Situation: Patient chart reviewed by care coordinator.    Background: Patient is enrolled in Care Coordination. CHW and SWCC are following.     Assessment: CHW attempted to reach patient on 4/26. No answer. CHW left a voicemail    Plan/Recommendations: CHW will attempt to contact patient again on or around 5/3. SWCC will plan to review chart in 6 weeks, per standard workflow, unless involvement is requested sooner    DOREEN Boyce  Primary Care Clinic- Social Work Care Coordinator  [facility] and [place]  Ph: [phone]  5/6/2022 10:49 AM

## 2022-05-18 ENCOUNTER — PATIENT OUTREACH (OUTPATIENT)
Dept: CARE COORDINATION | Facility: CLINIC | Age: 52
End: 2022-05-18
Payer: COMMERCIAL

## 2022-05-19 NOTE — PROGRESS NOTES
[facility]       Clinical Data: Care Coordinator Outreach  Outreach attempted x 2.Picked up and hung up x 2.  Plan:   Care Coordinator will try to reach patient again in 3-5 business days.  Next outreach due 5/25/22

## 2022-06-09 ENCOUNTER — PATIENT OUTREACH (OUTPATIENT)
Dept: CARE COORDINATION | Facility: CLINIC | Age: 52
End: 2022-06-09
Payer: COMMERCIAL

## 2022-06-09 NOTE — LETTER
[facility] CARE COORDINATION  [facility] [address]  [place] [address]    Jeanie 10, 2022    Dakota Wilkinson  [address]  [place] [address]      Dear Dakota,    I have been attempting to reach you since our last contact. I would like to continue to work with you and provide any additional support you may need on achieving your health care related goals. I would appreciate if you would give me a call at [phone] to let me know if you would like to continue working together. I know that there are many things that can affect our ability to communicate and I hope we can continue to work together.    All of us at the [facility] are invested in your health and are here to assist you in meeting your goals.     Sincerely,  MALENA Ferreira  [facility], [place] and [facility]  [phone]

## 2022-06-10 NOTE — PROGRESS NOTES
[facility]/Voicemail       Clinical Data: Care Coordinator Outreach  Outreach attempted x 3.  Left message on patient's voicemail with call back information and requested return call.  Plan: Care Coordinator will send unable to contact letter with care coordinator contact information via [employer]. Care Coordinator will try to reach patient again in 10 business days.  Next outreach due: 6/29/22 (CHW out of office at 10 days)

## 2022-06-20 ENCOUNTER — PATIENT OUTREACH (OUTPATIENT)
Dept: CARE COORDINATION | Facility: CLINIC | Age: 52
End: 2022-06-20
Payer: COMMERCIAL

## 2022-06-20 NOTE — PROGRESS NOTES
Clinic Care Coordination Contact    Situation: Patient chart reviewed by care coordinator.    Background: Patient is enrolled in Care Coordination. CHW and SWCC are following.     Assessment: CHW has attempted to contact patient x3. No answer and no call back. CHW left VM for patient. Sent unable to contact letter on 6/9.     Plan/Recommendations: CHW plans to contact patient on or around 6/29 for final follow up.    CHW will:  CHW Delegation:   1)  Due Date:  6/29/2022       Delegation: If no answer at next outreach, please notify SWCC to consider for disenrollment.         DOREEN Boyce  Primary Care Clinic- Social Work Care Coordinator  [facility] and [facility]  Ph: [phone]  6/20/2022 3:47 PM

## 2022-07-01 ENCOUNTER — PATIENT OUTREACH (OUTPATIENT)
Dept: CARE COORDINATION | Facility: CLINIC | Age: 52
End: 2022-07-01

## 2022-07-05 NOTE — PROGRESS NOTES
[facility]/Voicemail       Clinical Data: Care Coordinator Outreach  Outreach attempted x 1.  Left message on patient's voicemail with call back information and requested return call.  Plan:   Care Coordinator will try to reach patient again in 3-5 business days.  Next outreach due: 7/8/22

## 2022-07-20 ENCOUNTER — PATIENT OUTREACH (OUTPATIENT)
Dept: CARE COORDINATION | Facility: CLINIC | Age: 52
End: 2022-07-20

## 2022-07-20 NOTE — LETTER
[facility] CARE COORDINATION  [facility] [address]  [place] [address]    July 21, 2022    Dakota Wilkinson  [address]  [place] [address]      Dear Dakota,    I have been attempting to reach you since our last contact. I would like to continue to work with you and provide any additional support you may need on achieving your health care related goals. I would appreciate if you would give me a call at [phone] to let me know if you would like to continue working together. I know that there are many things that can affect our ability to communicate and I hope we can continue to work together.    All of us at the [facility] are invested in your health and are here to assist you in meeting your goals.     Sincerely,    MALENA Ferreira  [place], [place], [place], [place] and [facility]  [phone]

## 2022-07-21 NOTE — PROGRESS NOTES
[facility]/Voicemail       Clinical Data: Care Coordinator Outreach  Outreach attempted x 2.  Left message on patient's voicemail with call back information and requested return call.  Plan: Care Coordinator will send unable to contact letter with care coordinator contact information via [employer]. Care Coordinator will try to reach patient again in 10 business days.  Next outreach due: 8/3/22

## 2022-08-03 ENCOUNTER — PATIENT OUTREACH (OUTPATIENT)
Dept: CARE COORDINATION | Facility: CLINIC | Age: 52
End: 2022-08-03

## 2022-08-03 NOTE — PROGRESS NOTES
Clinic Care Coordination Contact    Situation: Patient chart reviewed by care coordinator.    Background: Patient is enrolled in Care Coordination. CHW and SWCC are following.     Assessment: CHW attempted to reach patient on 7/21. No answer. CHW left VM. Unable to contact letter was sent to patient.    Plan/Recommendations: CHW plans to contact patient on or around 8/3 for a third attempt.    CHW will:  CHW Delegation:   1)  Due Date:  8/3       Delegation: If patient does not answer at next outreach, please notify SWCC to consider for disenrollment           DOREEN Boyce  Primary Care Clinic- Social Work Care Coordinator  [facility] and [place]  Ph: [phone]  8/3/2022 10:03 AM

## 2022-08-15 ENCOUNTER — PATIENT OUTREACH (OUTPATIENT)
Dept: CARE COORDINATION | Facility: CLINIC | Age: 52
End: 2022-08-15

## 2022-08-16 NOTE — PROGRESS NOTES
UT/Voicemail       Clinical Data: Care Coordinator Outreach  Outreach attempted x 3.  Left message on patient's voicemail with call back information and requested return call.  Plan:CHW will request CC  chart review to disenroll due to being unreachable.   Care Coordinator will do no further outreaches at this time.

## 2022-09-19 ENCOUNTER — PATIENT OUTREACH (OUTPATIENT)
Dept: CARE COORDINATION | Facility: CLINIC | Age: 52
End: 2022-09-19

## 2022-09-19 NOTE — PROGRESS NOTES
Clinic Care Coordination Contact    Situation: Patient chart reviewed by care coordinator.    Background: Patient was enrolled in Care Coordination. CHW and [facility] were following.     Assessment: CHW attempted to reach patient x3. Left messages for patient. No return call from patient    Plan/Recommendations: [facility] disenrolled patient due to being unable to contact him. Please re-consult CC if further needs arise    DOREEN Boyce  Primary Care Clinic- Social Work Care Coordinator  [facility] Kimberlee Rizo  Ph: [phone]  9/19/2022 11:47 AM

## 2022-10-05 ASSESSMENT — SLEEP AND FATIGUE QUESTIONNAIRES
HOW LIKELY ARE YOU TO NOD OFF OR FALL ASLEEP WHILE SITTING QUIETLY AFTER LUNCH WITHOUT ALCOHOL: WOULD NEVER DOZE
HOW LIKELY ARE YOU TO NOD OFF OR FALL ASLEEP WHEN YOU ARE A PASSENGER IN A CAR FOR AN HOUR WITHOUT A BREAK: WOULD NEVER DOZE
HOW LIKELY ARE YOU TO NOD OFF OR FALL ASLEEP WHILE LYING DOWN TO REST IN THE AFTERNOON WHEN CIRCUMSTANCES PERMIT: WOULD NEVER DOZE
HOW LIKELY ARE YOU TO NOD OFF OR FALL ASLEEP WHILE WATCHING TV: WOULD NEVER DOZE
HOW LIKELY ARE YOU TO NOD OFF OR FALL ASLEEP WHILE SITTING AND READING: WOULD NEVER DOZE
HOW LIKELY ARE YOU TO NOD OFF OR FALL ASLEEP WHILE SITTING INACTIVE IN A PUBLIC PLACE: WOULD NEVER DOZE
HOW LIKELY ARE YOU TO NOD OFF OR FALL ASLEEP IN A CAR, WHILE STOPPED FOR A FEW MINUTES IN TRAFFIC: WOULD NEVER DOZE
HOW LIKELY ARE YOU TO NOD OFF OR FALL ASLEEP WHILE SITTING AND TALKING TO SOMEONE: WOULD NEVER DOZE

## 2022-10-06 ENCOUNTER — OFFICE VISIT (OUTPATIENT)
Dept: SLEEP MEDICINE | Facility: CLINIC | Age: 52
End: 2022-10-06
Payer: COMMERCIAL

## 2022-10-06 VITALS
DIASTOLIC BLOOD PRESSURE: 80 MMHG | HEART RATE: 84 BPM | WEIGHT: 185 LBS | BODY MASS INDEX: 30.82 KG/M2 | HEIGHT: 65 IN | SYSTOLIC BLOOD PRESSURE: 140 MMHG | OXYGEN SATURATION: 93 %

## 2022-10-06 DIAGNOSIS — F51.04 CHRONIC INSOMNIA: Primary | ICD-10-CM

## 2022-10-06 DIAGNOSIS — G80.1 SPASTIC DIPLEGIC CEREBRAL PALSY (H): Chronic | ICD-10-CM

## 2022-10-06 DIAGNOSIS — G47.61 PERIODIC LIMB MOVEMENT DISORDER (PLMD): ICD-10-CM

## 2022-10-06 PROCEDURE — 99214 OFFICE O/P EST MOD 30 MIN: CPT | Performed by: INTERNAL MEDICINE

## 2022-10-06 RX ORDER — CX-024414 0.2 MG/ML
INJECTION, SUSPENSION INTRAMUSCULAR
COMMUNITY
Start: 2022-05-24

## 2022-10-06 NOTE — PROGRESS NOTES
Chief complaint: Muscle spasms at night    LOV 2/2/21 SVEE    History of Present Illness: 52-year-old gentleman with history of cerebral palsy, insomnia and abnormal movement activity at night.  He reports that his sleep is interrupted by muscle spasms.  He has difficulty falling asleep because of this. He is taking baclofen. He also notes that he has some anxiety about sleeping because of this.  This has been going on for many many years.  It tends to happen mostly at night.  Muscle spasms create a movement like a horse kicking.  He ends up getting muscle fatigue.  He is currently taking baclofen for this.  He used Mirapex for this for couple of years with some good results per the notes.  However, patient reports that the side effects outweighed the benefits.  He had daytime lethargy.  Some nonmedication based therapy such as stretching, massage and walking helps a little.    He saw neurology about 3 years ago for headaches and light sensitivity and was prescribed gabapentin.  He does not recall trying it.  He does think he might have it at home but its 3 years old.  He has retired and has noted a deterioration in his sleep schedule as well.    He does not think he does any sleepwalking.  His sleep is not currently witnessed.  He does thinks at one point he did jump out of bed.    [place] Sleepiness Scale  Total score - [place]: 0 (10/5/2022 11:04 AM)  (Less than 10 normal)    Insomnia Severity Scale  HENNA Total Score: 28  (normal 0-7, mild 8-14, moderate 15-21, severe 22-28)    Past Medical History:   Diagnosis Date     Closed displaced comminuted fracture of shaft of left tibia 7/14/2017     Hypertension        Allergies   Allergen Reactions     Amoxicillin Hives and Itching     Codeine Hives and Itching     Morphine Hives and Itching     Pt does tolerate dilaudid     Penicillins Hives and Itching     Percocet [Oxycodone-Acetaminophen]      Passed out       Current Outpatient Medications   Medication     baclofen  (LIORESAL) 10 MG tablet     MODERNA COVID-19 VACCINE 100 MCG/0.5ML injection     acetaminophen (TYLENOL) 500 MG tablet     aspirin (ASA) 325 MG EC tablet     HYDROmorphone (DILAUDID) 2 MG tablet     senna-docusate (SENOKOT-S/PERICOLACE) 8.6-50 MG tablet     No current facility-administered medications for this visit.       Social History     Socioeconomic History     Marital status:      Spouse name: Not on file     Number of children: Not on file     Years of education: Not on file     Highest education level: Not on file   Occupational History     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[employer]   Tobacco Use     Smoking status: Former Smoker     Packs/day: 0.00     Years: 0.00     Pack years: 0.00     Smokeless tobacco: Never Used   Substance and Sexual Activity     Alcohol use: Yes     Comment: socially     Drug use: Never     Sexual activity: Not Currently   Other Topics Concern     Parent/sibling w/ CABG, MI or angioplasty before 65F 55M? No   Social History Narrative    Retired lives with mom and step dad and dog. 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 Non smoker, occ etoh/     Enjoys going to dinner with friends     Social Determinants of Health     Financial Resource Strain: Low Risk      Difficulty of Paying Living Expenses: Not hard at all   Food Insecurity: No Food Insecurity     Worried About Running Out of Food in the Last Year: Never true     Ran Out of Food in the Last Year: Never true   Transportation Needs: No Transportation Needs     Lack of Transportation (Medical): No     Lack of Transportation (Non-Medical): No   Physical Activity: Not on file   Stress: Not on file   Social Connections: Not on file   Intimate Partner Violence: Not on file   Housing Stability: Not on file       Family History   Problem Relation Age of Onset     Stomach Problem Mother      Diabetes Maternal Grandmother      Cerebrovascular Disease Maternal Grandmother      Dementia Maternal  "Grandmother      Cancer Maternal Grandfather      Diabetes Paternal Grandmother      Cerebrovascular Disease Paternal Grandmother      Colon Cancer Paternal Grandmother      Prostate Cancer Paternal Grandmother      Other Cancer Paternal Grandmother            EXAM:  BP (!) 140/80   Pulse 84   Ht 1.651 m (5' 5\")   Wt 83.9 kg (185 lb)   SpO2 93%   BMI 30.79 kg/m    GENERAL:Alert and no distress  EYES: Eyes grossly normal to inspection.  No discharge or erythema, or obvious scleral/conjunctival abnormalities.  RESP: No audible wheeze, cough, or visible cyanosis.  No visible retractions or increased work of breathing.  Chest is clear to auscultation bilaterally  Cardiac tones regular rate and rhythm S1-S2 normal  SKIN: Visible skin clear. No significant rash, abnormal pigmentation or lesions.  NEURO: Cranial nerves grossly intact.  Mentation and speech appropriate for age.  PSYCH: Mentation appears normal, affect normal, judgement and insight intact, stuttering speech and appearance well-groomed.       PSG 12/6/2020  Weight 140 lbs BMI 23.3  AHI 2.7, RDI 3.5, Lowest O2 SAT 77.6%   Time with O2 sat at or below 88% was 50.6 minutes (artifact suspected per )  PLM index 139, PLM arousal index 4.6      ASSESSMENT:  52-year-old gentleman with spastic diplegia cerebral palsy, muscles activity at night that is causing muscle fatigue and sleep disruption, insomnia.  He gets's little benefit from baclofen.  He had some short-term benefit from Mirapex.    PLAN:  Recommend that he be reevaluated by a neurologist.  Dr. Gallego has since retired.  Also recommend that he follow-up with Dr. Heredia in the insomnia program.  Strongly urged him to try to implement a regular daytime schedule as this could help him resume a regular nighttime schedule.      30 minutes spent on the date of the encounter doing chart review, history and exam, documentation and further activities per the note    Greta Diaz, " M.D.  Pulmonary/Critical Care/Sleep Medicine    [facility]   Floor [address], [address]   [address] [place] [address]   Appointments: [phone]    The above note was dictated using voice recognition software and may include typographical errors. Please contact the author for any clarifications.

## 2022-10-06 NOTE — PATIENT INSTRUCTIONS

## 2022-10-16 ENCOUNTER — HEALTH MAINTENANCE LETTER (OUTPATIENT)
Age: 52
End: 2022-10-16

## 2022-11-01 DIAGNOSIS — N30.00 ACUTE CYSTITIS WITHOUT HEMATURIA: ICD-10-CM

## 2022-11-02 RX ORDER — NITROFURANTOIN 25; 75 MG/1; MG/1
CAPSULE ORAL
Qty: 20 CAPSULE | Refills: 0 | OUTPATIENT
Start: 2022-11-02

## 2022-11-03 ENCOUNTER — VIRTUAL VISIT (OUTPATIENT)
Dept: FAMILY MEDICINE | Facility: CLINIC | Age: 52
End: 2022-11-03
Payer: COMMERCIAL

## 2022-11-03 DIAGNOSIS — Z12.11 SCREEN FOR COLON CANCER: ICD-10-CM

## 2022-11-03 DIAGNOSIS — R39.9 URINARY TRACT INFECTION SYMPTOMS: Primary | ICD-10-CM

## 2022-11-03 PROCEDURE — 99214 OFFICE O/P EST MOD 30 MIN: CPT | Mod: 95 | Performed by: FAMILY MEDICINE

## 2022-11-03 RX ORDER — NITROFURANTOIN 25; 75 MG/1; MG/1
100 CAPSULE ORAL 2 TIMES DAILY
Qty: 20 CAPSULE | Refills: 0 | Status: SHIPPED | OUTPATIENT
Start: 2022-11-03 | End: 2022-11-13

## 2022-11-03 NOTE — PROGRESS NOTES
Dakota is a 52 year old who is being evaluated via a billable telephone visit.      What phone number would you like to be contacted at? [phone]  How would you like to obtain your AVS? Ulisses Pearson   Dakota is a 52 year old presenting for the following health issues:  No chief complaint on file.      History of Present Illness       Reason for visit:  Renew my Rx for my UTI  Symptom onset:  3-4 weeks ago  Symptoms include:  Pain in genital area, odor of unrine, feeling of inability to completely urinate  Symptom intensity:  Moderate  Symptom progression:  Staying the same  Had these symptoms before:  Yes  Has tried/received treatment for these symptoms:  Yes  Previous treatment was successful:  Yes    He eats 0-1 servings of fruits and vegetables daily.He consumes 0 sweetened beverage(s) daily.He exercises with enough effort to increase his heart rate 20 to 29 minutes per day.  He exercises with enough effort to increase his heart rate 3 or less days per week.   He is taking medications regularly.         Review of Systems   Constitutional, HEENT, cardiovascular, pulmonary, GI, , musculoskeletal, neuro, skin, endocrine and psych systems are negative, except as otherwise noted.      Objective           Vitals:  No vitals were obtained today due to virtual visit.    Physical Exam   healthy, alert and no distress  PSYCH: Alert and oriented times 3; coherent speech, normal   rate and volume, able to articulate logical thoughts, able   to abstract reason, no tangential thoughts, no hallucinations   or delusions  His affect is normal  RESP: No cough, no audible wheezing, able to talk in full sentences  Remainder of exam unable to be completed due to telephone visits    A/P:  (R39.9) Urinary tract infection symptoms  (primary encounter diagnosis)  Comment:   Plan: nitroFURantoin macrocrystal-monohydrate         (MACROBID) 100 MG capsule, UA with Microscopic         reflex to Culture - lab collect        Treat  with nitrofurantoin. Discussed if symptoms do not improve in 3 days that patient should give us a urine sample for analysis. Patient understands and agrees with plan.    (Z12.11) Screen for colon cancer  Comment:   Plan: COLOGUARD([employer])            Jonathan Garcia MD    Total telephone visit time: 9 minutes

## 2022-11-03 NOTE — PATIENT INSTRUCTIONS
At [facility], we strive to deliver an exceptional experience to you, every time we see you. If you receive a survey, please complete it as we do value your feedback.  If you have MyChart, you can expect to receive results automatically within 24 hours of their completion.  Your provider will send a note interpreting your results as well.   If you do not have MyChart, you should receive your results in about a week by mail.    Your care team:                            Family Medicine Internal Medicine   MD Tanner Santana MD Shantel Branch-Fleming, MD Srinivasa Vaka, MD Katya Belousova, PACARLOS Rothman CNP, MD ([facility]   Chuy Mario, MD Tiny Hoang MD Amelia Massimini APRN FRANC Richards APRN MD Pastora Waterman MD          Clinic hours: Monday - Thursday 7 am-6 pm; Fridays 7 am-5 pm.   Urgent care: Monday - Friday 10 am- 8 pm; Saturday and Sunday 9 am-5 pm.    Clinic: [phone]       [facility]: Monday - Thursday 8 am - 7 pm; Friday 8 am - 6 pm  [facility]: [phone]

## 2022-11-18 LAB — NONINV COLON CA DNA+OCC BLD SCRN STL QL: NORMAL

## 2023-02-23 ENCOUNTER — TELEPHONE (OUTPATIENT)
Dept: FAMILY MEDICINE | Facility: CLINIC | Age: 53
End: 2023-02-23
Payer: COMMERCIAL

## 2023-03-26 ENCOUNTER — HEALTH MAINTENANCE LETTER (OUTPATIENT)
Age: 53
End: 2023-03-26

## 2023-06-06 ENCOUNTER — TELEPHONE (OUTPATIENT)
Dept: FAMILY MEDICINE | Facility: CLINIC | Age: 53
End: 2023-06-06
Payer: COMMERCIAL

## 2023-06-06 NOTE — TELEPHONE ENCOUNTER
Patient Quality Outreach    Patient is due for the following:   Colon Cancer Screening  Physical Preventive Adult Physical      Topic Date Due     Hepatitis B Vaccine (1 of 3 - 3-dose series) Never done     Zoster (Shingles) Vaccine (1 of 2) Never done       Next Steps:   Schedule a Adult Preventative    Type of outreach:    Sent [employer] message.      Questions for provider review:    None           Meredith Chowdhury MA

## 2023-07-10 ENCOUNTER — TELEPHONE (OUTPATIENT)
Dept: FAMILY MEDICINE | Facility: CLINIC | Age: 53
End: 2023-07-10

## 2023-07-10 ENCOUNTER — ALLIED HEALTH/NURSE VISIT (OUTPATIENT)
Dept: FAMILY MEDICINE | Facility: CLINIC | Age: 53
End: 2023-07-10
Payer: COMMERCIAL

## 2023-07-10 DIAGNOSIS — Z23 ENCOUNTER FOR IMMUNIZATION: Primary | ICD-10-CM

## 2023-07-10 PROCEDURE — 99207 PR NO CHARGE NURSE ONLY: CPT

## 2023-07-10 PROCEDURE — 90746 HEPB VACCINE 3 DOSE ADULT IM: CPT

## 2023-07-10 PROCEDURE — 90471 IMMUNIZATION ADMIN: CPT

## 2023-07-10 PROCEDURE — 90472 IMMUNIZATION ADMIN EACH ADD: CPT

## 2023-07-10 PROCEDURE — 90750 HZV VACC RECOMBINANT IM: CPT

## 2023-07-10 NOTE — TELEPHONE ENCOUNTER
Patient calling to request Covid booster shot.    If patient is due for Covid booster please call back and assist in scheduling visit to get this completed.    ISELA Reyes  [facility] Primary Care Triage

## 2023-07-10 NOTE — PROGRESS NOTES
Prior to immunization administration, verified patients identity using patient s name and date of birth. Please see Immunization Activity for additional information.     Screening Questionnaire for Adult Immunization    Are you sick today?   No   Do you have allergies to medications, food, a vaccine component or latex?   No   Have you ever had a serious reaction after receiving a vaccination?   No   Do you have a long-term health problem with heart, lung, kidney, or metabolic disease (e.g., diabetes), asthma, a blood disorder, no spleen, complement component deficiency, a cochlear implant, or a spinal fluid leak?  Are you on long-term aspirin therapy?   No   Do you have cancer, leukemia, HIV/AIDS, or any other immune system problem?   No   Do you have a parent, brother, or sister with an immune system problem?   No   In the past 3 months, have you taken medications that affect  your immune system, such as prednisone, other steroids, or anticancer drugs; drugs for the treatment of rheumatoid arthritis, Crohn s disease, or psoriasis; or have you had radiation treatments?   No   Have you had a seizure, or a brain or other nervous system problem?   No   During the past year, have you received a transfusion of blood or blood    products, or been given immune (gamma) globulin or antiviral drug?   No   For women: Are you pregnant or is there a chance you could become       pregnant during the next month?   No   Have you received any vaccinations in the past 4 weeks?   No     Immunization questionnaire answers were all negative.    I have reviewed the following standing orders:   This patient is due and qualifies for the Hepatitis B vaccine.    Click here for Hepatitis B Standing Order    I have reviewed the vaccines inclusion and exclusion criteria; No concerns regarding eligibility.         This patient is due and qualifies for the Zoster vaccine.    Click here for Zoster Standing Order    I have reviewed the vaccines  inclusion and exclusion criteria; No concerns regarding eligibility.         Patient instructed to remain in clinic for 15 minutes afterwards, and to report any adverse reactions.     Screening performed by Shakila Clarke MA on 7/10/2023 at 12:39 PM.

## 2023-07-10 NOTE — TELEPHONE ENCOUNTER
Patient not due for COVID booster.    This writer attempted to contact patient on 07/10/23      Reason for call informed not due for covid and left detailed message.      If patient calls back:   2nd floor [place] Care Team (MA/TC) called. Inform patient that someone from the team will contact them, document that pt called and route to care team.         Nereida De Santiago MA

## 2023-10-11 ENCOUNTER — ALLIED HEALTH/NURSE VISIT (OUTPATIENT)
Dept: FAMILY MEDICINE | Facility: CLINIC | Age: 53
End: 2023-10-11
Payer: COMMERCIAL

## 2023-10-11 DIAGNOSIS — Z23 ENCOUNTER FOR IMMUNIZATION: Primary | ICD-10-CM

## 2023-10-11 PROCEDURE — 90480 ADMN SARSCOV2 VAC 1/ONLY CMP: CPT

## 2023-10-11 PROCEDURE — 90471 IMMUNIZATION ADMIN: CPT

## 2023-10-11 PROCEDURE — 99207 PR NO CHARGE NURSE ONLY: CPT

## 2023-10-11 PROCEDURE — 91320 SARSCV2 VAC 30MCG TRS-SUC IM: CPT

## 2023-10-11 PROCEDURE — 90750 HZV VACC RECOMBINANT IM: CPT

## 2023-10-11 NOTE — PROGRESS NOTES
Prior to immunization administration, verified patients identity using patient s name and date of birth. Please see Immunization Activity for additional information.     Screening Questionnaire for Adult Immunization    Are you sick today?   No   Do you have allergies to medications, food, a vaccine component or latex?   No   Have you ever had a serious reaction after receiving a vaccination?   No   Do you have a long-term health problem with heart, lung, kidney, or metabolic disease (e.g., diabetes), asthma, a blood disorder, no spleen, complement component deficiency, a cochlear implant, or a spinal fluid leak?  Are you on long-term aspirin therapy?   No   Do you have cancer, leukemia, HIV/AIDS, or any other immune system problem?   No   Do you have a parent, brother, or sister with an immune system problem?   No   In the past 3 months, have you taken medications that affect  your immune system, such as prednisone, other steroids, or anticancer drugs; drugs for the treatment of rheumatoid arthritis, Crohn s disease, or psoriasis; or have you had radiation treatments?   No   Have you had a seizure, or a brain or other nervous system problem?   No   During the past year, have you received a transfusion of blood or blood    products, or been given immune (gamma) globulin or antiviral drug?   No   For women: Are you pregnant or is there a chance you could become       pregnant during the next month?   No   Have you received any vaccinations in the past 4 weeks?   No     Immunization questionnaire answers were all negative.    I have reviewed the following standing orders:   This patient is due and qualifies for the Covid-19 vaccine.     Click here for COVID-19 Standing Order    I have reviewed the vaccines inclusion and exclusion criteria; No concerns regarding eligibility.     This patient is due and qualifies for the Zoster vaccine.    Click here for Zoster Standing Order    I have reviewed the vaccines inclusion  and exclusion criteria; No concerns regarding eligibility.     Patient instructed to remain in clinic for 15 minutes afterwards, and to report any adverse reactions.     Screening performed by Shakila Clarke MA on 10/11/2023 at 8:28 AM.

## 2024-06-01 ENCOUNTER — HEALTH MAINTENANCE LETTER (OUTPATIENT)
Age: 54
End: 2024-06-01

## 2024-06-10 ENCOUNTER — TELEPHONE (OUTPATIENT)
Dept: FAMILY MEDICINE | Facility: CLINIC | Age: 54
End: 2024-06-10
Payer: COMMERCIAL

## 2024-06-10 NOTE — TELEPHONE ENCOUNTER
Patient Quality Outreach    Patient is due for the following:   Hypertension -  Hypertension follow-up visit  Colon Cancer Screening  Physical Preventive Adult Physical      Topic Date Due    Hepatitis B Vaccine (2 of 3 - 19+ 3-dose series) 08/07/2023       Next Steps:   Schedule a Adult Preventative    Type of outreach:    Sent [employer] message.      Questions for provider review:    None           Racheal Erickson MA

## 2025-06-14 ENCOUNTER — HEALTH MAINTENANCE LETTER (OUTPATIENT)
Age: 55
End: 2025-06-14

## (undated) DEVICE — MAT FLOOR SURGICAL 40X38 0702140238

## (undated) DEVICE — SUCTION MANIFOLD NEPTUNE 2 SYS 1 PORT 702-025-000

## (undated) DEVICE — SOL NACL 0.9% IRRIG 1000ML BOTTLE 2F7124

## (undated) DEVICE — PREP POVIDONE IODINE SOLUTION 10% 4OZ BOTTLE 29906-004

## (undated) DEVICE — GLOVE BIOGEL PI INDICATOR 9.0 LF  41690

## (undated) DEVICE — PLATE GROUNDING ADULT W/CORD 9165L

## (undated) DEVICE — CUSTOM PACK TOTAL KNEE SOP5BTKHEC

## (undated) DEVICE — DRSG GAUZE 4X4" TRAY 6939

## (undated) DEVICE — IMPLANTABLE DEVICE: Type: IMPLANTABLE DEVICE | Site: LEG | Status: NON-FUNCTIONAL

## (undated) DEVICE — SLEEVE DRILL SYN SUPRAPATELLAR 12MM 03.010.437S

## (undated) DEVICE — WIRE GUIDE 3.2X400MM  357.399

## (undated) DEVICE — GOWN IMPERVIOUS BREATHABLE 2XL/XLONG

## (undated) DEVICE — DRILL BIT 3.2MM QC/NEEDLE POIN

## (undated) DEVICE — PREP POVIDONE-IODINE 7.5% SCRUB 4OZ BOTTLE MDS093945

## (undated) DEVICE — GLOVE BIOGEL INDICATOR 7.5 LF 41675

## (undated) DEVICE — TRAY PREP DRY SKIN SCRUB 067

## (undated) DEVICE — DRILL BIT 3.2MM QC/330/100MM

## (undated) DEVICE — DRSG ADAPTIC 3X8" 6113

## (undated) DEVICE — SUTURE VICRYL+ 2-0 27IN CT-1 UND VCP259H

## (undated) DEVICE — ROD SYN REAMER BALL TIP 2.5X1150MM 351.708S

## (undated) DEVICE — DRAPE C-ARMOR 5 SIDED 5523

## (undated) DEVICE — SOL WATER IRRIG 1000ML BOTTLE 2F7114

## (undated) DEVICE — DRAPE C-ARM 60X42" 1013

## (undated) DEVICE — GLOVE SURG PI ULTRA TOUCH M SZ 8-1/2 LF

## (undated) DEVICE — GLOVE BIOGEL PI ULTRATOUCH G SZ 7.0 42170

## (undated) DEVICE — SU ETHILON 3-0 PS-2 18" 1669H

## (undated) DEVICE — SUTURE VICRYL+ 0 27IN CT-1 UND VCP260H

## (undated) RX ORDER — FENTANYL CITRATE 50 UG/ML
INJECTION, SOLUTION INTRAMUSCULAR; INTRAVENOUS
Status: DISPENSED
Start: 2022-01-27

## (undated) RX ORDER — PROPOFOL 10 MG/ML
INJECTION, EMULSION INTRAVENOUS
Status: DISPENSED
Start: 2022-01-27

## (undated) RX ORDER — FENTANYL CITRATE-0.9 % NACL/PF 10 MCG/ML
PLASTIC BAG, INJECTION (ML) INTRAVENOUS
Status: DISPENSED
Start: 2022-01-27

## (undated) RX ORDER — DEXAMETHASONE SODIUM PHOSPHATE 10 MG/ML
INJECTION, EMULSION INTRAMUSCULAR; INTRAVENOUS
Status: DISPENSED
Start: 2022-01-27